# Patient Record
Sex: MALE | Race: WHITE | NOT HISPANIC OR LATINO | Employment: OTHER
[De-identification: names, ages, dates, MRNs, and addresses within clinical notes are randomized per-mention and may not be internally consistent; named-entity substitution may affect disease eponyms.]

---

## 2020-03-02 ENCOUNTER — HEALTH MAINTENANCE LETTER (OUTPATIENT)
Age: 65
End: 2020-03-02

## 2020-12-20 ENCOUNTER — HEALTH MAINTENANCE LETTER (OUTPATIENT)
Age: 65
End: 2020-12-20

## 2021-04-18 ENCOUNTER — HEALTH MAINTENANCE LETTER (OUTPATIENT)
Age: 66
End: 2021-04-18

## 2021-10-03 ENCOUNTER — HEALTH MAINTENANCE LETTER (OUTPATIENT)
Age: 66
End: 2021-10-03

## 2022-01-23 ENCOUNTER — HEALTH MAINTENANCE LETTER (OUTPATIENT)
Age: 67
End: 2022-01-23

## 2022-03-21 ENCOUNTER — MEDICAL CORRESPONDENCE (OUTPATIENT)
Dept: HEALTH INFORMATION MANAGEMENT | Facility: CLINIC | Age: 67
End: 2022-03-21
Payer: MEDICARE

## 2022-04-05 ENCOUNTER — TRANSFERRED RECORDS (OUTPATIENT)
Dept: HEALTH INFORMATION MANAGEMENT | Facility: CLINIC | Age: 67
End: 2022-04-05
Payer: MEDICARE

## 2022-04-18 ENCOUNTER — MEDICAL CORRESPONDENCE (OUTPATIENT)
Dept: HEALTH INFORMATION MANAGEMENT | Facility: CLINIC | Age: 67
End: 2022-04-18
Payer: MEDICARE

## 2022-04-18 ENCOUNTER — TRANSFERRED RECORDS (OUTPATIENT)
Dept: HEALTH INFORMATION MANAGEMENT | Facility: CLINIC | Age: 67
End: 2022-04-18
Payer: MEDICARE

## 2022-04-18 ENCOUNTER — TELEPHONE (OUTPATIENT)
Dept: SURGERY | Facility: CLINIC | Age: 67
End: 2022-04-18
Payer: MEDICARE

## 2022-04-18 ENCOUNTER — TRANSCRIBE ORDERS (OUTPATIENT)
Dept: OTHER | Age: 67
End: 2022-04-18

## 2022-04-18 DIAGNOSIS — K22.5 ZENKER DIVERTICULUM: Primary | ICD-10-CM

## 2022-04-18 NOTE — TELEPHONE ENCOUNTER
[facility] Call [facility]    Phone Message    May a detailed message be left on voicemail: no     Reason for Call: Appointment Intake    Referring Provider Name: Liliana PORTILLO @ [facility]  Diagnosis and/or Symptoms: Zenker diverticulum [K22.5]    Patient scheduled 1st available, if sooner appointment is needed please reach out to patient.    Action Taken: Other: ENT    Travel Screening: Not Applicable

## 2022-04-19 NOTE — TELEPHONE ENCOUNTER
FUTURE VISIT INFORMATION      FUTURE VISIT INFORMATION:    Date: 5/9/22    Time: 2:40PM    Location: [facility] [place]  REFERRAL INFORMATION:    Referring provider:  Liliana Cisneros PA     Referring providers clinic:  [facility]    Reason for visit/diagnosis  Per Pt, dx Zenker diverticulum [K22.5] referral from Liliana PORTILLO @ [facility]    RECORDS REQUESTED FROM:       Clinic name Comments Records Status Imaging Status   [facility] 4/5/22 note from Liliana Cisneros PA  Care everywhere     [facility]        1/30/18 ENDOSCOPIC DIVERTICULECTOMY ZENKERS Care everywhere    [facility]   [facility]    [address] [place] [address]    [phone]   4/18/22 XR Esophagus - PACS    11/22/17 XR Video Swallow   11/16/17 XR Esophagus   2/28/17 XR Chest    Care everywhere req 4/19/22 - PACS                       4/19/22 1:47PM called aida, Lyndsey images will be pushed shortly - Amay

## 2022-04-22 ENCOUNTER — TELEPHONE (OUTPATIENT)
Dept: OTOLARYNGOLOGY | Facility: CLINIC | Age: 67
End: 2022-04-22
Payer: MEDICARE

## 2022-04-22 NOTE — TELEPHONE ENCOUNTER
I tried calling this patient to help reschedule the appt with Dr. Barajas on 5/9/22. This appt needs to be rescheduled as [facility] New Throat with Dr. Cho. This was reviewed by ENT staff and Dr. Cho is the correct provider and next available is OK, does not need to be an urgent appt.      Appt Note- Ref by Liliana PORTILLO @ [facility] for Zenker Diverticulum

## 2022-04-22 NOTE — TELEPHONE ENCOUNTER
[facility] Call Center    Phone Message    May a detailed message be left on voicemail: yes     Reason for Call: Appointment Intake    Referring Provider Name: Liliana PORTILLO   Diagnosis and/or Symptoms:Zenker diverticulum    Per notes, schedule with Dr. Cho next available, patient states he can not wait until August, would like a sooner appt. Please reach out to patient with other scheduling options if available. Thank you      Action Taken: Message routed to:  [facility] ENT     Travel Screening: Not Applicable

## 2022-04-26 ENCOUNTER — DOCUMENTATION ONLY (OUTPATIENT)
Dept: OTOLARYNGOLOGY | Facility: CLINIC | Age: 67
End: 2022-04-26
Payer: MEDICARE

## 2022-04-26 DIAGNOSIS — R13.12 OROPHARYNGEAL DYSPHAGIA: Primary | ICD-10-CM

## 2022-04-26 NOTE — CONFIDENTIAL NOTE
Video Esophagram Review Rounds  Imaging Review of MBSS conducted with attending physician Azra Cho and reviewed/discussed with SLP Helen Valadez, Effie Wilson, Bety Perez, and/or Smita Dow    Relevant Background:  1/30/18 ENDOSCOPIC DIVERTICULECTOMY ZENKERS    Esophagram: 4/18/22 outside zenker's    Recommendations:  Needs Xray Video Swallow Exam and office evaluation

## 2022-05-04 NOTE — TELEPHONE ENCOUNTER
FUTURE VISIT INFORMATION      FUTURE VISIT INFORMATION:    Date: 6/28/22    Time: 1PM    Location: [facility] [place]  REFERRAL INFORMATION:    Referring provider:  Liliana Cisneros PA     Referring providers clinic:  [facility]    Reason for visit/diagnosis  Per Pt, dx Zenker diverticulum [K22.5] referral from Liliana PORTILLO @ [facility]     RECORDS REQUESTED FROM:         Clinic name Comments Records Status Imaging Status   [facility] 4/5/22 note from Liliana Cisneros PA  Care everywhere       [facility]          1/30/18 ENDOSCOPIC DIVERTICULECTOMY ZENKERS Care everywhere     [facility]   [facility]    [address] [place] [address]    [phone]   4/18/22 XR Esophagus - PACS     11/22/17 XR Video Swallow   11/16/17 XR Esophagus   2/28/17 XR Chest     Care everywhere req 4/19/22 - PACS

## 2022-05-09 ENCOUNTER — PRE VISIT (OUTPATIENT)
Dept: OTOLARYNGOLOGY | Facility: CLINIC | Age: 67
End: 2022-05-09
Payer: MEDICARE

## 2022-05-10 DIAGNOSIS — R13.10 DYSPHAGIA: Primary | ICD-10-CM

## 2022-05-11 ENCOUNTER — TELEPHONE (OUTPATIENT)
Dept: OTOLARYNGOLOGY | Facility: CLINIC | Age: 67
End: 2022-05-11
Payer: MEDICARE

## 2022-05-11 NOTE — TELEPHONE ENCOUNTER
[facility] Call Center    Phone Message    May a detailed message be left on voicemail: yes     Reason for Call: Other: pt got a message that a swallow test was ordered for him by Dr. Cho and that it was scheduled. Pt would like to know why another swallow test is being done when he had one done in April. Please call pt back to discuss with him why the procedure was ordered.    Thank you   Action Taken: Message routed to:  [facility] ent    Travel Screening: Not Applicable

## 2022-05-11 NOTE — TELEPHONE ENCOUNTER
I tried calling this patient to help schedule his Video Swallow Study. There was one option on the day of his follow up so I scheduled that. If he calls back to reschedule it can be any option prior to the follow up with Dr. Cho on 6/28 @ 1pm.      --Appt Info--  Tuesday, 6/28 @ 11am Video Swallow Study  Tuesday, 6/28 @ 1pm Dr. Cho follow up

## 2022-05-12 NOTE — TELEPHONE ENCOUNTER
Writer called to explain to patient the difference in the swallow studies he had done and the new order. Patient verbalized understanding and thanked me for the explanation.     Sarah Santiago RN on 5/12/2022 at 4:19 PM

## 2022-06-27 NOTE — PROGRESS NOTES
[facility]   at the [facility]   Otolaryngology Clinic     Patient: Chris Meadows    MRN: [id number]    : 1955    Age/Gender: 66 year old male  Date of Service: 2022  Rendering Provider:   Azra Cho MD     Referring Provider   PCP: No Ref-Primary, Physician  Referring Physician: Referred Self, MD  No address on file  Reason for Consultation   Dysphonia  Dysphagia  History   HISTORY OF PRESENT ILLNESS: Mr. Meadows is a 66 year old male who presents to us today with dysphagia.      Of note, has history of endoscopic zenker's diverticulectomy.    he presents today for evaluation. he reports:    Dysphonia  - for a while now  - has difficulty projecting at times  - he can project if he tries  - voice goes out when he is in Jew - when he has to read, after he walks up to the front of the Jew  - his wife has trouble understanding him  - has associated sore throat which is the reason why he initially went back to the doctor    Dysphagia  - had surgery in 2018  - had food get stuck  - doesn't have that now  - but feels food sometimes take a while to go down     Dyspnea  - denies    Throat clearing/cough  - does have some throat clearing    GERD/LPRD   - rajeev    PAST MEDICAL HISTORY:   Past Medical History:   Diagnosis Date     Allergic rhinitis, cause unspecified      Diverticulosis of colon (without mention of hemorrhage)      Hallux malleus      Intestinal disaccharidase deficiencies and disaccharide malabsorption      Osteoarthrosis, unspecified whether generalized or localized, lower leg      Other and unspecified hyperlipidemia      Unspecified sleep apnea        PAST SURGICAL HISTORY:   Past Surgical History:   Procedure Laterality Date     COMBINED REPAIR PTOSIS WITH BLEPHAROPLASTY BILATERAL  2013    Procedure: COMBINED REPAIR PTOSIS WITH BLEPHAROPLASTY BILATERAL;  BILATERAL UPPER LID BLEPHAROPLASTY AND PTOSIS REPAIR  ;  Surgeon: River Lazo MD;  Location:  [facility]       CURRENT MEDICATIONS:   Current Outpatient Medications:      Ascorbic Acid (VITAMIN C PO), Take 500 mg by mouth., Disp: , Rfl:      chromium 200 MCG CAPS, Take 200 mcg by mouth daily., Disp: , Rfl:      Cinnamon Bark POWD, , Disp: , Rfl:      clindamycin (CLEOCIN) 150 MG capsule, Take 1 capsule (150 mg) by mouth 3 times daily, Disp: 24 capsule, Rfl: 0     co-enzyme Q-10 100 MG CAPS, Take  by mouth daily., Disp: , Rfl:      dhea 25 MG TABS, Take 25 mg by mouth daily., Disp: , Rfl:      glucosamine-chondroitin 500-400 MG CAPS, Take 1 capsule by mouth daily., Disp: , Rfl:      Omega-3 Fatty Acids (OMEGA-3 FISH OIL PO), Take 500 mg by mouth., Disp: , Rfl:      vitamin D (ERGOCALCIFEROL) 63839 UNIT capsule, Take 400 Units by mouth daily., Disp: , Rfl:      VITAMIN E NATURAL PO, Take  by mouth., Disp: , Rfl:      multivitamin, therapeutic with minerals (MULTI-VITAMIN) TABS, Take 1 tablet by mouth daily., Disp: , Rfl:      red yeast rice 600 MG CAPS, Take 600 mg by mouth daily., Disp: , Rfl:      SAW BJ, , Disp: , Rfl:   No current facility-administered medications for this visit.    Facility-Administered Medications Ordered in Other Visits:      barium sulfate 40% (VARIBAR THIN HONEY) oral suspension 25 mL, 25 mL, Oral, Once, Haja Sow MD    ALLERGIES: Vicodin [hydrocodone-acetaminophen]    SOCIAL HISTORY:    Social History     Socioeconomic History     Marital status:      Spouse name: Not on file     Number of children: Not on file     Years of education: Not on file     Highest education level: Not on file   Occupational History     Not on file   Tobacco Use     Smoking status: Former Smoker     Quit date: 1988     Years since quittin.9     Smokeless tobacco: Not on file   Substance and Sexual Activity     Alcohol use: No     Drug use: No     Sexual activity: Never   Other Topics Concern     Parent/sibling w/ CABG, MI or angioplasty before 65F 55M? Not Asked   Social  History Narrative     Not on file     Social Determinants of Health     Financial Resource Strain: Not on file   Food Insecurity: Not on file   Transportation Needs: Not on file   Physical Activity: Not on file   Stress: Not on file   Social Connections: Not on file   Intimate Partner Violence: Not on file   Housing Stability: Not on file         FAMILY HISTORY: History reviewed. No pertinent family history.  Non-contributory for problems with anesthesia    REVIEW OF SYSTEMS:   The patient was asked a 14 point review of systems regarding constitutional symptoms, eye symptoms, ears, nose, mouth, throat symptoms, cardiovascular symptoms, respiratory symptoms, gastrointestinal symptoms, genitourinary symptoms, musculoskeletal symptoms, integumentary symptoms, neurological symptoms, psychiatric symptoms, endocrine symptoms, hematologic/lymphatic symptoms, and allergic/ immunologic symptoms.   The pertinent factors have been included in the HPI and below.  Patient Supplied Answers to Review of Systems  [facility] ROS 6/23/2022   Ears, Nose, Throat: Hearing loss, Nasal congestion or drainage, Sore throat   Musculoskeletal: Back pain, Neck pain       Physical Examination   The patient underwent a physical examination as described below. The pertinent positive and negative findings are summarized after the description of the examination.  Constitutional: The patient's developmental and nutritional status was assessed. The patient's voice quality was assessed.  Head and Face: The head and face were inspected for deformities. The sinuses were palpated. The salivary glands were palpated. Facial muscle strength was assessed bilaterally.  Eyes: Extraocular movements and primary gaze alignment were assessed.  Ears, Nose, Mouth and Throat: The ears and nose were examined for deformities. The nasal septum, mucosa, and turbinates were inspected by anterior rhinoscopy. The lips, teeth, and gums were examined for abnormalities. The oral  mucosa, tongue, palate, tonsils, lateral and posterior pharynx were inspected for the presence of asymmetry or mucosal lesions.    Neck: The tracheal position was noted, and the neck mass palpated to determine if there were any asymmetries, abnormal neck masses, thyromegally, or thyroid nodules.  Respiratory: The nature of the breathing and chest expansion/symmetry was observed.  Cardiovascular: The patient was examined to determine the presence of any edema or jugular venous distension.  Abdomen: The contour of the abdomen was noted.  Lymphatic: The patient was examined for infraclavicular lymphadenopathy.  Musculoskeletal: The patient was inspected for the presence of skeletal deformities.  Extremities: The extremities were examined for any clubbing or cyanosis.  Skin: The skin was examined for inflammatory or neoplastic conditions.  Neurologic: The patient's orientation, mood, and affect were noted. The cranial nerve  functions were examined.  Other pertinent positive and negative findings on physical examination:      OC/OP: no lesions, uvula midline, soft palate elevates symmetrically  Neck: no lesions, R>L TH tenderness to palpation    All other physical examination findings were within normal limits and noncontributory.  Procedures   Flexible laryngoscopy (CPT 86424)      Pre-procedure diagnosis: dysphonia  Post-procedure diagnosis: same as above  Indication for procedure: Mr. Meadows is a 66 year old male with see above  Procedure(s): Fiberoptic Laryngoscopy    Details of Procedure: After informed consent was obtained, the patient was seated in the examination chair.  The areas of the nasopharynx as well as the hypopharynx were anesthetized with topical 4% lidocaine with 0.25% phenylephrine atomizer.  Examination of the base of tongue was performed first.  Attention was directed to any evidence of masses in the area or evidence of leukoplakia or candidal infection.  Attention was directed to the epiglottis  where its size and position was determined and its movement on phonation of the vowel  e .  The piriform sinuses were then inspected for any mass lesions or pooling of secretions.  Attention was then directed to the larynx. The vocal folds were inspected for infection or any areas of leukoplakia, for masses, polypoid degeneration, or hemorrhage.  Having done this, the arytenoids and vocal processes were inspected for erythema or evidence of granuloma formation.  The posterior commissure was then inspected for evidence of inflammatory changes in the mucosa and heaping up of mucosal tissue. The patient was then instructed to say the vowel  e .  Adduction of vocal folds to the midline was observed for any evidence of paresis or paralysis of the larynx or asymmetry in rotation of the larynx to the left or right. The patient was asked to breathe and the degree of abduction was noted bilaterally.  Subglottic view of the larynx was obtained for any additional mass lesions or mucosal changes.  Finally the post cricoid was examined for evidence of pooling of secretions, as well as the pharyngeal wall mucosa.   Anesthesia type: 0.25% phenylephrine    Findings:  Anatomic/physiological deviations: RNC, presbylarynx, mild mucus regurgitating in the left pyriform   Right vocal process: No restriction of mobility   Left vocal process: No restriction of mobility  Glottal gap: Glottal gap  Supraglottic structures: Normal  Hypopharynx: Normal     Estimated Blood Loss: minimal  Complications: None  Disposition: Patient tolerated the procedure well                  Fiberoptic Endoscopic Evaluation of Swallowing (CPT 44211)  and Interpretation of Swallowing (CPT 88664)    Indications: See above notes for complete history and physical. Patient complains of dysphagia to both solids and liquids and/or there is suggestion on history and endoscopic exam of the presence of dysphagia causing medical complaints.  Swallowing evaluation is being  performed to assess the presence and degree of dysphagia, and to recommend a safe diet.     Pulmonary Status:  No PNA   Current Diet:              regular                                        thin liquids      Consistency Amounts:  Thin Liquid: sip   Puree: 1 tsp  Solid: cookies            Positioning: upright in a chair  Oral Peripheral Exam: See physical exam section.  Anatomic Notes: See Videostroboscopy report for assessment of anatomy and laryngeal functioning  Pharyngeal secretions prior to administration of liquid or food: No  Oral Phase Abnormal Findings: No abnormal behavior observed  Behavioral Adaptations: No abnormal behavior observed  Pharyngeal Phase Abnormal Findings: very slight EPR with the first bite of puree    Recommended Diet:  regular                                        thin liquids               Review of Relevant Clinical Data   I personally reviewed:  Liliana Cisneros 4/5/22 -   Patient's sore throat started in December when he had COVID. He feels like he is swallowing pretty well now. He does not feel like food is getting stuck. He has an occasional cough and a raspy voice. Non-smoker. Has allergies and uses a saline nasal spray. He has been taking Protonix the last couple weeks but does not think it is helping much. He denies heartburn symptoms but does endorse stomach bloating and gas. Denies sinusitis, nasal drainage. He has some nasal congestion. He tries to sleep slightly elevated.    He has a history of Zenker's diverticulum and had surgery back in 2018          Notes: Video Esophagram Review Rounds  Imaging Review of MBSS conducted with attending physician Azra Cho and reviewed/discussed with SLP Helen Valadez, Effie Wilson, Bety Perez, and/or Smita Dow     Relevant Background:  1/30/18 ENDOSCOPIC DIVERTICULECTOMY ZENKERS     Esophagram: 4/18/22 outside zenker's     Recommendations:  Needs Xray Video Swallow Exam and office evaluation          MBSS Date:  "6/28/22     Findings:  Pharyngeal Weakness:No  Epiglottic dysfunction: No  Penetration: No  Aspiration: No  UES dysfunction: zenker's  Details: safe swallow, zenker's - does not regurgitate        Recommendations:  Diet: current            Radiology:    Pathology:    Procedures:    Labs:  No results found for: TSH  No results found for: NA, CO2, BUN, CREAT, GLUCOSE, PHOS  No results found for: WBC, HGB, HCT, MCV, PLT  No results found for: PT, PTT, INR  No results found for: MICHAEL  No components found for: RHEUMATOIDFACTOR,  RF  No results found for: CRP  No components found for: CKTOT, URICACID  No components found for: C3, C4, DSDNAAB, NDNAABIFA  No results found for: MPOAB    Patient reported Quality of Life (QOL) Measures   Patient Supplied Answers To VHI Questionnaire  Voice Handicap Index (VHI-10) 6/23/2022   My voice makes it difficult for people to hear me 2   People have difficulty understanding me in a noisy room 2   My voice difficulties restrict my personal and social life.  2   I feel left out of conversations because of my voice 1   My voice problem causes me to lose income 0   I feel as though I have to strain to produce voice 1   The clarity of my voice is unpredictable 2   My voice problem upsets me 2   My voice makes me feel handicapped 2   People ask, \"What's wrong with your voice?\" 1   VHI-10 15         Patient Supplied Answers To EAT Questionnaire  Eating Assessment Tool (EAT-10) 6/23/2022   My swallowing problem has caused me to lose weight 0   My swallowing problem interferes with my ability to go out for meals 0   Swallowing liquids takes extra effort 0   Swallowing solids takes extra effort 1   Swallowing pills takes extra effort 1   Swallowing is painful 1   The pleasure of eating is affected by my swallowing 1   When I swallow food sticks in my throat 0   I cough when I eat 0   Swallowing is stressful 1   EAT-10 5         Patient Supplied Answers To CSI Questionnaire  Cough Severity Index " (CSI) 2022   My cough is worse when I lie down 0   My coughing problem causes me to restrict my personal and social life 0   I tend to avoid places because of my cough problem 0   I feel embarrassed because of my coughing problem 0   People ask, ''What's wrong?'' because I cough a lot 0   I run out of air when I cough 0   My coughing problem affects my voice 1   My coughing problem limits my physical activity 0   My coughing problem upsets me 0   People ask me if I am sick because I cough a lot 0   CSI Score 1         Patient Supplied Answers to Dyspnea Index Questionnaire:  No flowsheet data found.    Impression & Plan     IMPRESSION: Mr. Meadows is a 66 year old male who is being seen for the followin. Dysphagia  - in the setting of endoscopic zenker's diverticulectomy in 2018  - now with sore throat since 2021 after COVID  - overall throat is better however in the work up he was found to have his zenker's back  - is symptomatic but not as much as before  - Xray Video Swallow Exam today reviewed at rounds shows a zenker's diverticulum without EPR, this is similar to the FEES however on the scope he did have saliva regurgitate in the left pyriform and also very slight puree EPR with the first bite  - discussed that his sore throat symptoms are likely more due to his muscle tension dypshonia rather than the pouch  - however he does have slight symptoms from the zenker's so would recommend surgery  - he would like to get this done since he has been waiting for this for a few months  - discussed given he has favorable anatomy an endoscopic CO2 laser myotomy is indicated  - risks, benefits and alternatives were discussed   Plan  - schedule surgery    2. 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 Meadows. If you have any questions, please do not hesitate to contact me.    Azra Cho MD    Laryngology    [facility]  Department of  Otolaryngology - Head and Neck Surgery  [facility]  [address] [place] [address]  Appointment line: [phone]  Fax: [phone]  [email or web address]

## 2022-06-28 ENCOUNTER — OFFICE VISIT (OUTPATIENT)
Dept: OTOLARYNGOLOGY | Facility: CLINIC | Age: 67
End: 2022-06-28
Payer: MEDICARE

## 2022-06-28 ENCOUNTER — ANCILLARY PROCEDURE (OUTPATIENT)
Dept: GENERAL RADIOLOGY | Facility: CLINIC | Age: 67
End: 2022-06-28
Attending: OTOLARYNGOLOGY
Payer: MEDICARE

## 2022-06-28 ENCOUNTER — DOCUMENTATION ONLY (OUTPATIENT)
Dept: OTOLARYNGOLOGY | Facility: CLINIC | Age: 67
End: 2022-06-28

## 2022-06-28 ENCOUNTER — PREP FOR PROCEDURE (OUTPATIENT)
Dept: OTOLARYNGOLOGY | Facility: CLINIC | Age: 67
End: 2022-06-28

## 2022-06-28 ENCOUNTER — PRE VISIT (OUTPATIENT)
Dept: OTOLARYNGOLOGY | Facility: CLINIC | Age: 67
End: 2022-06-28
Payer: MEDICARE

## 2022-06-28 ENCOUNTER — THERAPY VISIT (OUTPATIENT)
Dept: SPEECH THERAPY | Facility: CLINIC | Age: 67
End: 2022-06-28
Attending: OTOLARYNGOLOGY
Payer: MEDICARE

## 2022-06-28 VITALS
BODY MASS INDEX: 27.4 KG/M2 | DIASTOLIC BLOOD PRESSURE: 67 MMHG | HEIGHT: 69 IN | OXYGEN SATURATION: 93 % | HEART RATE: 63 BPM | WEIGHT: 185 LBS | SYSTOLIC BLOOD PRESSURE: 134 MMHG

## 2022-06-28 DIAGNOSIS — R49.0 DYSPHONIA: Primary | ICD-10-CM

## 2022-06-28 DIAGNOSIS — R13.12 OROPHARYNGEAL DYSPHAGIA: Primary | ICD-10-CM

## 2022-06-28 DIAGNOSIS — J38.3 VOCAL CORD BOWING: ICD-10-CM

## 2022-06-28 DIAGNOSIS — R13.10 DYSPHAGIA, UNSPECIFIED TYPE: Primary | ICD-10-CM

## 2022-06-28 DIAGNOSIS — K22.0 CRICOPHARYNGEAL ACHALASIA: Primary | ICD-10-CM

## 2022-06-28 DIAGNOSIS — R13.10 DYSPHAGIA: ICD-10-CM

## 2022-06-28 DIAGNOSIS — R07.0 THROAT PAIN: ICD-10-CM

## 2022-06-28 PROCEDURE — 92524 BEHAVRAL QUALIT ANALYS VOICE: CPT | Mod: GN | Performed by: SPEECH-LANGUAGE PATHOLOGIST

## 2022-06-28 PROCEDURE — 99204 OFFICE O/P NEW MOD 45 MIN: CPT | Mod: 25 | Performed by: OTOLARYNGOLOGY

## 2022-06-28 PROCEDURE — 92612 ENDOSCOPY SWALLOW (FEES) VID: CPT | Mod: GN | Performed by: OTOLARYNGOLOGY

## 2022-06-28 PROCEDURE — 92610 EVALUATE SWALLOWING FUNCTION: CPT | Mod: GN | Performed by: SPEECH-LANGUAGE PATHOLOGIST

## 2022-06-28 PROCEDURE — 92613 ENDOSCOPY SWALLOW (FEES) I&R: CPT | Performed by: OTOLARYNGOLOGY

## 2022-06-28 PROCEDURE — 92611 MOTION FLUOROSCOPY/SWALLOW: CPT | Mod: GN | Performed by: SPEECH-LANGUAGE PATHOLOGIST

## 2022-06-28 PROCEDURE — 74230 X-RAY XM SWLNG FUNCJ C+: CPT | Mod: GC | Performed by: RADIOLOGY

## 2022-06-28 RX ORDER — BARIUM SULFATE 400 MG/ML
25 SUSPENSION ORAL ONCE
Status: ACTIVE | OUTPATIENT
Start: 2022-06-28

## 2022-06-28 RX ORDER — BARIUM SULFATE 400 MG/ML
25 SUSPENSION ORAL ONCE
Status: COMPLETED | OUTPATIENT
Start: 2022-06-28 | End: 2022-06-28

## 2022-06-28 RX ORDER — AMPICILLIN AND SULBACTAM 2; 1 G/1; G/1
3 INJECTION, POWDER, FOR SOLUTION INTRAMUSCULAR; INTRAVENOUS
Status: CANCELLED | OUTPATIENT
Start: 2022-06-28

## 2022-06-28 RX ORDER — AMPICILLIN AND SULBACTAM 1; .5 G/1; G/1
1.5 INJECTION, POWDER, FOR SOLUTION INTRAMUSCULAR; INTRAVENOUS SEE ADMIN INSTRUCTIONS
Status: CANCELLED | OUTPATIENT
Start: 2022-06-28

## 2022-06-28 RX ADMIN — BARIUM SULFATE 25 ML: 400 SUSPENSION ORAL at 10:59

## 2022-06-28 ASSESSMENT — PAIN SCALES - GENERAL: PAINLEVEL: NO PAIN (0)

## 2022-06-28 NOTE — PATIENT INSTRUCTIONS
If you have any questions you may reach out to me at [email or web address] once some level of normalcy has returned you may reach me via my office phone number is [phone].    I will have my colleague Michael Tamez who helps us with financial counseling reach out regarding the cost of therapy, expect to hear from him in the near future.    Sarah

## 2022-06-28 NOTE — PATIENT INSTRUCTIONS
You were seen in the ENT Clinic today by Dr. Cho. If you have any questions or concerns after your appointment, please contact us (see below)    2.   SLP Therapy with Colin.    3.Schedule Surgery first week in August, 2022.     5. Schedule pre-op or keep current PCP appointment in July, 8 to do so.    4.Please return to clinic - one month after surgery.  Surgery Teaching    1. Someone from our scheduling department will call you within the next few days to get you scheduled with your provider for surgery. If no one has called you in one week, please notify us.    2. You must have a physical exam (called  history and physical ) within 30 days of surgery. You may complete this with your primary care provider.   A. If your provider is outside of the [facility] network please have them complete the preoperative forms provided to you in the surgery packet you will be mailed and be sure to have your provider fax them to the appropriate location prior to surgery. For surgery at the [facility] the fax number [phone]. For surgery at the [place] the fax number is [phone].  B. In some cases we may have you see our Preoperative Assessment Center. If we have expressed this to you, our  will set up your appointment with them when they call to set up your surgery.    3. Complete a COVID test 4 days prior to surgery. You will need to have this done regardless of whether you have had the COVID vaccine. If you have the test performed at a clinic outside of the network, you will need to have the test results faxed to us.    4.Covid test required    If you're going home on the same day as your procedure (surgery):  1 to 2 days before your procedure, take an at-home, rapid antigen test. You can buy these at many pharmacy stores. Or you can order free, at-home tests at covid.gov/tests. If you can't find an at-home, rapid antigen test, please schedule a PCR test with [facility] by calling [phone], or visiting  [email or web address]. We can't accept tests that are more than 4 days old.  If your test is negative, please take a photo of the test. Show the photo to the nurse when you come in for your procedure.  If your test is positive, please see the \"If your test shows you have COVID-19\" section on this page    If you're staying overnight in the hospital:4 days before your procedure, please get a PCR test from a lab.  To schedule a PCR test with [facility], call [phone]. Or, visit   [email or web address].  If your test is negative, please ask the testing location to fax your results to us at [phone].  If your test is positive, please tell your surgeon's office right away. A positive test means that you have COVID-19. We'll probably have to postpone your admission, surgery or procedure. Your care team will discuss this with you. After that, we'll let you know what to do and when you can re-schedule.    5. Ask your doctor what medicines are safe before surgery. For over the counter medications and supplements it is advised that you do NOT TAKE MOTRIN, IBUPROFEN, ASPIRIN, ALEVE, GARLIC SUPPLEMENTS or FISH OIL x 7 days prior to surgery (to prevent excess bleeding and bruising at time of surgery). If your provider advises you to take any medication the morning of surgery you should take this with a sip of water.    6. A few days prior to surgery a nurse will call you to review your health history and instructions for before and after surgery. They will give you your final arrival time based upon your scheduled arrival time for surgery.    7. Call the surgical team if there's any change in your health prior to surgery. Things you should call for include but are not limited to signs of a cold or the flu (sore throat, runny nose, cough, rash, fever). Other things to notify them for is for any open wounds (cuts, scrapes, scratches) near to the surgery site.    8. If you drink " alcohol, stop drinking alcohol at least 24 hours before surgery.    9. If you smoke, stop or at least cut down on smoking 24 hours before surgery.    10.Take a bath or shower the night before and the morning of surgery (as told by your surgeon). Use an antiseptic soap. If your doctor does not give you special soap, buy Hibiclens or Eliza-Stat at the drug store or ask the pharmacist to suggest a brand. You will wash with this from the neck down, washing your hair and face as you would normally.   A. When you are done with your shower please be sure to use clean towels to dry with, have clean linens on your bed, and put on clean clothes each time.   B. DO NOT put on lotion, powder, perfume, deodorant or make-up after bathing.    11. You can eat a normal meal the night before surgery. Do not eat any solid foods or drink any milk products for 8 hours before surgery.     12. You may drink clear liquids until 2 hours before surgery. Clear liquids include water, Gatorade, apple juice and liquids you can see through.    13. No eating or drinking 2 hours prior to surgery until after surgery. Your post op team will review any diet limitations you might have and when you can start eating and drinking again after surgery.      If you have any questions before or after surgery please call:    How to Contact Us:  Send a [employer] message to your provider. Our team will respond to you via [employer]. Occasionally, we will need to call you to get further information.  For urgent matters (Monday-Friday), call the ENT Clinic: [phone] and speak with a call center team member - they will route your call appropriately.   If you'd like to speak directly with a nurse, please find our contact information below. We do our best to check voicemail frequently throughout the day, and will work to call you back within 1-2 days. For urgent matters, please use the general clinic phone numbers listed above      KAMAR Malloy  ENT RN Care  Coordinator  [phone]      Ann PLUNKETT LPN  Direct: [phone]                     How to Contact Us:  Send a [employer] message to your provider. Our team will respond to you via [employer]. Occasionally, we will need to call you to get further information.  For urgent matters (Monday-Friday), call the ENT Clinic: [phone] and speak with a call center team member - they will route your call appropriately.   If you'd like to speak directly with a nurse, please find our contact information below. We do our best to check voicemail frequently throughout the day, and will work to call you back within 1-2 days. For urgent matters, please use the general clinic phone numbers listed above.      KAMAR Malloy  ENT RN Care Coordinator      Ann PLUNKTET LPN  Direct: [phone]

## 2022-06-28 NOTE — LETTER
Date:July 1, 2022      Patient was self referred, no letter generated. Do not send.        [facility] Health Information

## 2022-06-28 NOTE — LETTER
"6/28/2022       RE: Chris Meadows  [address] [address] [facility] [address]     Dear Colleague,    Thank you for referring your patient, Chris Meadows, to the [facility] VOICE CLINIC [place] at [facility]. Please see a copy of my visit note below.    [facility] VOICE [facility]  Evaluation report    Clinician: Papa Fernandez M.M. M.A., CCC/SLP  Seen in conjunction with: Dr. Cho  Referring physician:  Liliana PORTILLO  Patient: Chris Meadows  Date of Visit: 6/28/2022    HISTORY  Chief complaint: Chris Meadows is a 66 year old gentleman presenting today for evaluation of dysphagia and sore throat.    Chart Review: He has a history significant for patient has a history of Zenker's diverticulum which was surgically repaired 2018.  Patient was seen on 4/5/2022 at the [place] [facility].  At that time he reported the sensation of sore throat.  Laryngeal evaluation was within normal limits, and the patient was subsequently sent for repeat esophagram.  The results of that demonstrated \"a Zenker's diverticulum is present, centered around C6-7 and measuring about 3.6 cm and 2.8 in the AP dimension.  Contrast remained within.  Patient was subsequently referred to our clinic for further evaluation and treatment as warranted.  He presents for this today.    Of note I was unable to be present for the history gathering section of today's appointment.  Please see Dr. Cho and my colleague Smita Dow's notes for additional details in this regard.    Past Medical History:   Diagnosis Date     Allergic rhinitis, cause unspecified      Diverticulosis of colon (without mention of hemorrhage)      Hallux malleus      Intestinal disaccharidase deficiencies and disaccharide malabsorption      Osteoarthrosis, unspecified whether generalized or localized, lower leg      Other and unspecified hyperlipidemia      Unspecified sleep apnea      Past Surgical History:   Procedure " "Laterality Date     COMBINED REPAIR PTOSIS WITH BLEPHAROPLASTY BILATERAL  7/25/2013    Procedure: COMBINED REPAIR PTOSIS WITH BLEPHAROPLASTY BILATERAL;  BILATERAL UPPER LID BLEPHAROPLASTY AND PTOSIS REPAIR  ;  Surgeon: River Lazo MD;  Location: [facility]       OBJECTIVE  PATIENT REPORTED MEASURES  Patient Supplied Answers To VHI Questionnaire  Voice Handicap Index (VHI-10) 6/23/2022   My voice makes it difficult for people to hear me 2   People have difficulty understanding me in a noisy room 2   My voice difficulties restrict my personal and social life.  2   I feel left out of conversations because of my voice 1   My voice problem causes me to lose income 0   I feel as though I have to strain to produce voice 1   The clarity of my voice is unpredictable 2   My voice problem upsets me 2   My voice makes me feel handicapped 2   People ask, \"What's wrong with your voice?\" 1   VHI-10 15     Patient Supplied Answers To CSI Questionnaire  Cough Severity Index (CSI) 6/23/2022   My cough is worse when I lie down 0   My coughing problem causes me to restrict my personal and social life 0   I tend to avoid places because of my cough problem 0   I feel embarrassed because of my coughing problem 0   People ask, ''What's wrong?'' because I cough a lot 0   I run out of air when I cough 0   My coughing problem affects my voice 1   My coughing problem limits my physical activity 0   My coughing problem upsets me 0   People ask me if I am sick because I cough a lot 0   CSI Score 1     Patient Supplied Answers To EAT Questionnaire  Eating Assessment Tool (EAT-10) 6/23/2022   My swallowing problem has caused me to lose weight 0   My swallowing problem interferes with my ability to go out for meals 0   Swallowing liquids takes extra effort 0   Swallowing solids takes extra effort 1   Swallowing pills takes extra effort 1   Swallowing is painful 1   The pleasure of eating is affected by my swallowing 1   When I swallow food sticks " in my throat 0   I cough when I eat 0   Swallowing is stressful 1   EAT-10 5     PERCEPTUAL EVALUATION (CPT 56917)  POSTURE / TENSION:     neck and shoulders    BREATHING:     phonation is not coordinated with respiration    LARYNGEAL PALPATION:     tenderness of the thyrohyoid area    reduced thyrohyoid space    right greater than left    VOICE:    Roughness: Mild Intermittent    Breathiness: Minimal    Strain: Moderate Intermittent    Aesthenia: Mild to moderate Intermittent    Loudness    Conversational speech:  Moderately reduced intermittently    Projected speech:  WNL    Pitch:    Conversational speech:  WNL    Pitch glide:     Adequate access to left registration without notable breaks    Resonance:    Conversational speech:  laryngeal pharyngeal resonance    Singing vs. Speech: Decreased roughness and strain in singing versus running speech    COUGH/THROAT CLEARING:    Occasional    THERAPY PROBES: Improvement was elicited with use of forward resonant stimuli, use of clear speech protocol and coordination of respiration and phonation    LARYNGEAL EXAMINATION  Procedure: Flexible endoscopy with chip-tip technology without stroboscopy, right nostril; topical anesthesia with 3% Lidocaine and 0.25% phenylephrine was not applied.   Performed by: Dr. Azra Cho  The laryngeal and pharyngeal structures were evaluated for gross appearance, mobility, function, and focal lesions / abnormalities of the associated mucosa.    All findings were within normal limits with the exception of the following salient features:     Mild to mod pooling in left piriform sinus and to a lesser diegree postr cricoid an dright    VF appear bilaterally mildly concave     Intermittent 4 way constrictive hyperfunction in conjunction to non-optimal phonatory respiratory coordination, with frequent anterior glottal gap    Improved configuratation closure with forward resonant sounds and singing vs. Running speech    A screening swallow  evaluation was completed by Dr. Cho and my colleague Smita Dow CCC-SLP    The laryngeal exam was reviewed with Mr. Meadows, and I provided pertinent explanations, as well as written and oral information.    ASSESSMENT / PLAN  IMPRESSIONS: Chris Meadows is presenting today with throat discomfort and a history of Zenker's diverticulum status postsurgical repair in 2018.  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  Perceptually these findings manifest as intermittent strain, asthenia, and rough voice quality, that corresponds to moments of decreased respiratory drive and coordination of airflow, glottal control, and resonance.  In absence of discoordination increased muscular recruitment is noted and this corresponds to areas of tenderness on laryngeal palpation helping to explain the patient's persistent throat pain.  In addition to these elements laryngeal evaluation also showed some regurgitation from the UES consistent with ongoing presence of Zenker's diverticulum.  Please see Dr. Cho and Smita Dow CCC-SLP's notes from today's date for additional details regarding the patient's swallowing difficulties.      STIMULABILITY: results of therapy probes during perceptual and laryngeal evaluation demonstrate improvement with use of forward resonant stimuli and coordination of respiration and phonation    DETAILED RECOMMENDATIONS:     A course of speech therapy is recommended to optimize vocal technique, improve voice quality and promote reduced discomfort, effort and fatigue.    He demonstrates a Good prognosis for improvement given adherence to therapeutic recommendations.   o Positive indicators: positive response to therapy probes diagnosis is known to respond to treatment  o Negative indicators: External  locus of control    DURATION / FREQUENCY: 4 biweekly and 2 monthly one-hour sessions    Research: This patient is not a candidate.      GOALS:  Patient goal:     To understand the problem and fix it as much as possible    Short-term goal(s): Within the first 4 sessions, Mr. Meadows:    will demonstrate assigned laryngeal massage techniques with 80% accuracy or better with no clinician support    will be able to independently list key factors in maintenance of good vocal hygiene with 80% accuracy, and report on their use outside the therapy room.    will accurately identify target vs. habitual voice quality during therapy tasks in 4 out of 5 trials with no clinician support    will demonstrate the ability to alternate between target and habitual voice quality given clinician cue 75% of the time during therapy tasks    Long-term goal(s): In 6 months, Mr. Meadows will:    Report a week of typical vocal activities, in which dysphonia and throat discomfort do not exceed a level of 2 out of 10, 80% of the time   Certification period: June 28, 2022 - 9/26/22    This treatment plan was developed with the patient who agreed with the recommendations.    TOTAL SERVICE TIME: 30 minutes  EVALUATION OF VOICE AND RESONANCE (36179)  NO CHARGE FACILITY FEE (81338)    Papa Fernandez M.M., M.A., CCC-SLP  Speech-Language Pathologist  Certificate of Vocology  [phone]    *this report was created in part through the use of computerized dictation software, and though reviewed following completion, some typographic errors may persist.  If there is confusion regarding any of this notes contents, please contact me for clarification.*                                                                                       Outpatient Speech Language Therapy Evaluation  PLAN OF TREATMENT FOR OUTPATIENT REHABILITATION  (COMPLETE FOR INITIAL CLAIMS ONLY)  Patient's Last Name, First Name, M.I.  YOB: 1955  Chris Meadows                            Provider's Name  Colin Rebecca, SLP Medical Record No.  [id number]                               Onset Date:  6/28/22   Start of Care Date: 6/28/22     Type: Speech Language Therapy Medical Diagnosis: No primary diagnosis found.                        Therapy Diagnosis:  No primary diagnosis found.   Visits from SOC:  1   _________________________________________________________________________________  Plan of Treatment:   Speech therapy    DETAILED RECOMMENDATIONS:     A course of speech therapy is recommended to optimize vocal technique, improve voice quality and promote reduced discomfort, effort and fatigue.    He demonstrates a Good prognosis for improvement given adherence to therapeutic recommendations.   o Positive indicators: positive response to therapy probes diagnosis is known to respond to treatment  o Negative indicators: External locus of control    DURATION / FREQUENCY: 4 biweekly and 2 monthly one-hour sessions    Research: This patient is not a candidate.      GOALS:  Patient goal:     To understand the problem and fix it as much as possible    Short-term goal(s): Within the first 4 sessions, Mr. Meadows:    will demonstrate assigned laryngeal massage techniques with 80% accuracy or better with no clinician support    will be able to independently list key factors in maintenance of good vocal hygiene with 80% accuracy, and report on their use outside the therapy room.    will accurately identify target vs. habitual voice quality during therapy tasks in 4 out of 5 trials with no clinician support    will demonstrate the ability to alternate between target and habitual voice quality given clinician cue 75% of the time during therapy tasks    Long-term goal(s): In 6 months, Mr. Meadows will:    Report a week of typical vocal activities, in which dysphonia and throat discomfort do not exceed a level of 2 out of 10, 80% of the time      _________________________________________________________________________________    I CERTIFY THE NEED FOR THESE SERVICES FURNISHED UNDER        THIS PLAN OF TREATMENT AND WHILE UNDER MY CARE     (Physician attestation of this document indicates review and certification of the therapy plan).     Certification date from: 6/28/22  Certification date to: 9/26/22    Referring Provider: Azra Cho         Again, thank you for allowing me to participate in the care of your patient.      Sincerely,    Colin Fernandez, SLP

## 2022-06-28 NOTE — LETTER
Date:July 5, 2022      Patient was self referred, no letter generated. Do not send.        [facility] Health Information

## 2022-06-28 NOTE — LETTER
2022       RE: Chris Meadows  [id number] [facility] [address]     Dear Colleague,    Thank you for referring your patient, Chris Meadows, to the [facility] EAR NOSE AND THROAT [facility]INIC [place] at [facility]. Please see a copy of my visit note below.        [facility]   at the [facility]   Otolaryngology Clinic     Patient: Chris Meadows    MRN: [id number]    : 1955    Age/Gender: 66 year old male  Date of Service: 2022  Rendering Provider:   Azra Cho MD     Referring Provider   PCP: No Ref-Primary, Physician  Referring Physician: Referred Self, MD  No address on file  Reason for Consultation   Dysphonia  Dysphagia  History   HISTORY OF PRESENT ILLNESS: Mr. Meadows is a 66 year old male who presents to us today with dysphagia.      Of note, has history of endoscopic zenker's diverticulectomy.    he presents today for evaluation. he reports:    Dysphonia  - for a while now  - has difficulty projecting at times  - he can project if he tries  - voice goes out when he is in [employer] - when he has to read, after he walks up to the front of the [employer]  - his wife has trouble understanding him  - has associated sore throat which is the reason why he initially went back to the doctor    Dysphagia  - had surgery in 2018  - had food get stuck  - doesn't have that now  - but feels food sometimes take a while to go down     Dyspnea  - denies    Throat clearing/cough  - does have some throat clearing    GERD/LPRD   - rajeev    PAST MEDICAL HISTORY:   Past Medical History:   Diagnosis Date     Allergic rhinitis, cause unspecified      Diverticulosis of colon (without mention of hemorrhage)      Hallux malleus      Intestinal disaccharidase deficiencies and disaccharide malabsorption      Osteoarthrosis, unspecified whether generalized or localized, lower leg      Other and unspecified hyperlipidemia      Unspecified sleep  apnea        PAST SURGICAL HISTORY:   Past Surgical History:   Procedure Laterality Date     COMBINED REPAIR PTOSIS WITH BLEPHAROPLASTY BILATERAL  7/25/2013    Procedure: COMBINED REPAIR PTOSIS WITH BLEPHAROPLASTY BILATERAL;  BILATERAL UPPER LID BLEPHAROPLASTY AND PTOSIS REPAIR  ;  Surgeon: River Lazo MD;  Location: [facility]       CURRENT MEDICATIONS:   Current Outpatient Medications:      Ascorbic Acid (VITAMIN C PO), Take 500 mg by mouth., Disp: , Rfl:      chromium 200 MCG CAPS, Take 200 mcg by mouth daily., Disp: , Rfl:      Cinnamon Bark POWD, , Disp: , Rfl:      clindamycin (CLEOCIN) 150 MG capsule, Take 1 capsule (150 mg) by mouth 3 times daily, Disp: 24 capsule, Rfl: 0     co-enzyme Q-10 100 MG CAPS, Take  by mouth daily., Disp: , Rfl:      dhea 25 MG TABS, Take 25 mg by mouth daily., Disp: , Rfl:      glucosamine-chondroitin 500-400 MG CAPS, Take 1 capsule by mouth daily., Disp: , Rfl:      Omega-3 Fatty Acids (OMEGA-3 FISH OIL PO), Take 500 mg by mouth., Disp: , Rfl:      vitamin D (ERGOCALCIFEROL) 94050 UNIT capsule, Take 400 Units by mouth daily., Disp: , Rfl:      VITAMIN E NATURAL PO, Take  by mouth., Disp: , Rfl:      multivitamin, therapeutic with minerals (MULTI-VITAMIN) TABS, Take 1 tablet by mouth daily., Disp: , Rfl:      red yeast rice 600 MG CAPS, Take 600 mg by mouth daily., Disp: , Rfl:      JANELLE LORENZO, , Disp: , Rfl:   No current facility-administered medications for this visit.    Facility-Administered Medications Ordered in Other Visits:      barium sulfate 40% (VARIBAR THIN HONEY) oral suspension 25 mL, 25 mL, Oral, Once, Haja Sow MD    ALLERGIES: Vicodin [hydrocodone-acetaminophen]    SOCIAL HISTORY:    Social History     Socioeconomic History     Marital status:      Spouse name: Not on file     Number of children: Not on file     Years of education: Not on file     Highest education level: Not on file   Occupational History     Not on file   Tobacco Use      Smoking status: Former Smoker     Quit date: 1988     Years since quittin.9     Smokeless tobacco: Not on file   Substance and Sexual Activity     Alcohol use: No     Drug use: No     Sexual activity: Never   Other Topics Concern     Parent/sibling w/ CABG, MI or angioplasty before 65F 55M? Not Asked   Social History Narrative     Not on file     Social Determinants of Health     Financial Resource Strain: Not on file   Food Insecurity: Not on file   Transportation Needs: Not on file   Physical Activity: Not on file   Stress: Not on file   Social Connections: Not on file   Intimate Partner Violence: Not on file   Housing Stability: Not on file         FAMILY HISTORY: History reviewed. No pertinent family history.  Non-contributory for problems with anesthesia    REVIEW OF SYSTEMS:   The patient was asked a 14 point review of systems regarding constitutional symptoms, eye symptoms, ears, nose, mouth, throat symptoms, cardiovascular symptoms, respiratory symptoms, gastrointestinal symptoms, genitourinary symptoms, musculoskeletal symptoms, integumentary symptoms, neurological symptoms, psychiatric symptoms, endocrine symptoms, hematologic/lymphatic symptoms, and allergic/ immunologic symptoms.   The pertinent factors have been included in the HPI and below.  Patient Supplied Answers to Review of Systems   ENT ROS 2022   Ears, Nose, Throat: Hearing loss, Nasal congestion or drainage, Sore throat   Musculoskeletal: Back pain, Neck pain       Physical Examination   The patient underwent a physical examination as described below. The pertinent positive and negative findings are summarized after the description of the examination.  Constitutional: The patient's developmental and nutritional status was assessed. The patient's voice quality was assessed.  Head and Face: The head and face were inspected for deformities. The sinuses were palpated. The salivary glands were palpated. Facial muscle strength was  assessed bilaterally.  Eyes: Extraocular movements and primary gaze alignment were assessed.  Ears, Nose, Mouth and Throat: The ears and nose were examined for deformities. The nasal septum, mucosa, and turbinates were inspected by anterior rhinoscopy. The lips, teeth, and gums were examined for abnormalities. The oral mucosa, tongue, palate, tonsils, lateral and posterior pharynx were inspected for the presence of asymmetry or mucosal lesions.    Neck: The tracheal position was noted, and the neck mass palpated to determine if there were any asymmetries, abnormal neck masses, thyromegally, or thyroid nodules.  Respiratory: The nature of the breathing and chest expansion/symmetry was observed.  Cardiovascular: The patient was examined to determine the presence of any edema or jugular venous distension.  Abdomen: The contour of the abdomen was noted.  Lymphatic: The patient was examined for infraclavicular lymphadenopathy.  Musculoskeletal: The patient was inspected for the presence of skeletal deformities.  Extremities: The extremities were examined for any clubbing or cyanosis.  Skin: The skin was examined for inflammatory or neoplastic conditions.  Neurologic: The patient's orientation, mood, and affect were noted. The cranial nerve  functions were examined.  Other pertinent positive and negative findings on physical examination:      OC/OP: no lesions, uvula midline, soft palate elevates symmetrically  Neck: no lesions, R>L TH tenderness to palpation    All other physical examination findings were within normal limits and noncontributory.  Procedures   Flexible laryngoscopy (CPT 14714)      Pre-procedure diagnosis: dysphonia  Post-procedure diagnosis: same as above  Indication for procedure: Mr. Meadows is a 66 year old male with see above  Procedure(s): Fiberoptic Laryngoscopy    Details of Procedure: After informed consent was obtained, the patient was seated in the examination chair.  The areas of the  nasopharynx as well as the hypopharynx were anesthetized with topical 4% lidocaine with 0.25% phenylephrine atomizer.  Examination of the base of tongue was performed first.  Attention was directed to any evidence of masses in the area or evidence of leukoplakia or candidal infection.  Attention was directed to the epiglottis where its size and position was determined and its movement on phonation of the vowel  e .  The piriform sinuses were then inspected for any mass lesions or pooling of secretions.  Attention was then directed to the larynx. The vocal folds were inspected for infection or any areas of leukoplakia, for masses, polypoid degeneration, or hemorrhage.  Having done this, the arytenoids and vocal processes were inspected for erythema or evidence of granuloma formation.  The posterior commissure was then inspected for evidence of inflammatory changes in the mucosa and heaping up of mucosal tissue. The patient was then instructed to say the vowel  e .  Adduction of vocal folds to the midline was observed for any evidence of paresis or paralysis of the larynx or asymmetry in rotation of the larynx to the left or right. The patient was asked to breathe and the degree of abduction was noted bilaterally.  Subglottic view of the larynx was obtained for any additional mass lesions or mucosal changes.  Finally the post cricoid was examined for evidence of pooling of secretions, as well as the pharyngeal wall mucosa.   Anesthesia type: 0.25% phenylephrine    Findings:  Anatomic/physiological deviations: RNC, presbylarynx, mild mucus regurgitating in the left pyriform   Right vocal process: No restriction of mobility   Left vocal process: No restriction of mobility  Glottal gap: Glottal gap  Supraglottic structures: Normal  Hypopharynx: Normal     Estimated Blood Loss: minimal  Complications: None  Disposition: Patient tolerated the procedure well                  Fiberoptic Endoscopic Evaluation of Swallowing  (CPT 40056)  and Interpretation of Swallowing (CPT 99077)    Indications: See above notes for complete history and physical. Patient complains of dysphagia to both solids and liquids and/or there is suggestion on history and endoscopic exam of the presence of dysphagia causing medical complaints.  Swallowing evaluation is being performed to assess the presence and degree of dysphagia, and to recommend a safe diet.     Pulmonary Status:  No PNA   Current Diet:              regular                                        thin liquids      Consistency Amounts:  Thin Liquid: sip   Puree: 1 tsp  Solid: cookies            Positioning: upright in a chair  Oral Peripheral Exam: See physical exam section.  Anatomic Notes: See Videostroboscopy report for assessment of anatomy and laryngeal functioning  Pharyngeal secretions prior to administration of liquid or food: No  Oral Phase Abnormal Findings: No abnormal behavior observed  Behavioral Adaptations: No abnormal behavior observed  Pharyngeal Phase Abnormal Findings: very slight EPR with the first bite of puree    Recommended Diet:  regular                                        thin liquids               Review of Relevant Clinical Data   I personally reviewed:  Liliana Cisneros 4/5/22 -   Patient's sore throat started in December when he had COVID. He feels like he is swallowing pretty well now. He does not feel like food is getting stuck. He has an occasional cough and a raspy voice. Non-smoker. Has allergies and uses a saline nasal spray. He has been taking Protonix the last couple weeks but does not think it is helping much. He denies heartburn symptoms but does endorse stomach bloating and gas. Denies sinusitis, nasal drainage. He has some nasal congestion. He tries to sleep slightly elevated.    He has a history of Zenker's diverticulum and had surgery back in 2018          Notes: Video Esophagram Review Rounds  Imaging Review of MBSS conducted with attending physician  "Azra Cho and reviewed/discussed with SLP Helen Valadez, Effie Wilson, Bety Perez, and/or Smita Dow     Relevant Background:  1/30/18 ENDOSCOPIC DIVERTICULECTOMY ZENKERS     Esophagram: 4/18/22 outside zenker's     Recommendations:  Needs Xray Video Swallow Exam and office evaluation          [facility]: 6/28/22     Findings:  Pharyngeal Weakness:No  Epiglottic dysfunction: No  Penetration: No  Aspiration: No  UES dysfunction: zenker's  Details: safe swallow, zenker's - does not regurgitate        Recommendations:  Diet: current            Radiology:    Pathology:    Procedures:    Labs:  No results found for: TSH  No results found for: NA, CO2, BUN, CREAT, GLUCOSE, PHOS  No results found for: WBC, HGB, HCT, MCV, PLT  No results found for: PT, PTT, INR  No results found for: MICHAEL  No components found for: RHEUMATOIDFACTOR,  RF  No results found for: CRP  No components found for: CKTOT, URICACID  No components found for: C3, C4, DSDNAAB, NDNAABIFA  No results found for: MPOAB    Patient reported Quality of Life (QOL) Measures   Patient Supplied Answers To VHI Questionnaire  Voice Handicap Index (VHI-10) 6/23/2022   My voice makes it difficult for people to hear me 2   People have difficulty understanding me in a noisy room 2   My voice difficulties restrict my personal and social life.  2   I feel left out of conversations because of my voice 1   My voice problem causes me to lose income 0   I feel as though I have to strain to produce voice 1   The clarity of my voice is unpredictable 2   My voice problem upsets me 2   My voice makes me feel handicapped 2   People ask, \"What's wrong with your voice?\" 1   VHI-10 15         Patient Supplied Answers To EAT Questionnaire  Eating Assessment Tool (EAT-10) 6/23/2022   My swallowing problem has caused me to lose weight 0   My swallowing problem interferes with my ability to go out for meals 0   Swallowing liquids takes extra effort 0   Swallowing solids takes " extra effort 1   Swallowing pills takes extra effort 1   Swallowing is painful 1   The pleasure of eating is affected by my swallowing 1   When I swallow food sticks in my throat 0   I cough when I eat 0   Swallowing is stressful 1   EAT-10 5         Patient Supplied Answers To CSI Questionnaire  Cough Severity Index (CSI) 2022   My cough is worse when I lie down 0   My coughing problem causes me to restrict my personal and social life 0   I tend to avoid places because of my cough problem 0   I feel embarrassed because of my coughing problem 0   People ask, ''What's wrong?'' because I cough a lot 0   I run out of air when I cough 0   My coughing problem affects my voice 1   My coughing problem limits my physical activity 0   My coughing problem upsets me 0   People ask me if I am sick because I cough a lot 0   CSI Score 1         Patient Supplied Answers to Dyspnea Index Questionnaire:  No flowsheet data found.    Impression & Plan     IMPRESSION: Mr. Meadows is a 66 year old male who is being seen for the followin. 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 Meadows. If you have any questions, please do not hesitate to contact me.    Azra Cho MD    Laryngology    [facility]  Department of  Otolaryngology - Head and Neck Surgery  [facility] [place] [address]  Appointment line: [phone]  Fax: [phone]  [email or web address]       Relevant Diagnosis: Schedule Surgery first week in August, 2022 per Dr. Cho.  August  Teaching Topic: pre-op teaching  Person(s) involved in teaching:  Patient     Teaching Concerns Addressed:  Pre op teaching included the need for an H&P, NPO status pre op, hospital routines, expected recovery, activity  restrictions, antimicrobial scrub, s/s of infection, pain control methods and the importance of follow up appointments.  The patient voiced an understanding of all instructions and will call with questions.     Motivation Level:  Asks Questions:   Yes  Eager to Learn:   Yes  Cooperative:   Yes  Receptive (willing/able to accept information):   Yes     Patient  demonstrates understanding of the following:  Reason for the appointment, diagnosis and treatment plan:   Yes  Knowledge of proper use of medications and conditions for which they are ordered (with special attention to potential side effects or drug interactions):   Yes  Which situations necessitate calling provider and whom to contact:   Yes        Proper use and care of  (medical equip, care aids, etc.):   NA  Nutritional needs and diet plan:   Yes  Pain management techniques:    Yes  Patient instructed on hand hygiene:  Yes  How and/when to access community resources:   NA     Infection Prevention:  Patient   demonstrates understanding of the following:  Surgical procedure site care taught   Signs and symptoms of infection taught Yes  Wound care taught Yes     Instructional Materials Used/Given: AVS instructions      Again, thank you for allowing me to participate in the care of your patient.      Sincerely,    Azra Cho MD

## 2022-06-28 NOTE — PROGRESS NOTES
"[facility] VOICE CLINIC  Evaluation report    Clinician: Papa Fernandez M.M., M.A., CCC/SLP  Seen in conjunction with: Dr. Cho  Referring physician:  Liliana PORTILLO  Patient: Chris Meadows  Date of Visit: 6/28/2022    HISTORY  Chief complaint: Chris Meadows is a 66 year old gentleman presenting today for evaluation of dysphagia and sore throat.    Chart Review: He has a history significant for patient has a history of Zenker's diverticulum which was surgically repaired 2018.  Patient was seen on 4/5/2022 at the [facility].  At that time he reported the sensation of sore throat.  Laryngeal evaluation was within normal limits, and the patient was subsequently sent for repeat esophagram.  The results of that demonstrated \"a Zenker's diverticulum is present, centered around C6-7 and measuring about 3.6 cm and 2.8 in the AP dimension.  Contrast remained within.  Patient was subsequently referred to our clinic for further evaluation and treatment as warranted.  He presents for this today.    Of note I was unable to be present for the history gathering section of today's appointment.  Please see Dr. Cho and my colleague Smita Dow's notes for additional details in this regard.    Past Medical History:   Diagnosis Date     Allergic rhinitis, cause unspecified      Diverticulosis of colon (without mention of hemorrhage)      Hallux malleus      Intestinal disaccharidase deficiencies and disaccharide malabsorption      Osteoarthrosis, unspecified whether generalized or localized, lower leg      Other and unspecified hyperlipidemia      Unspecified sleep apnea      Past Surgical History:   Procedure Laterality Date     COMBINED REPAIR PTOSIS WITH BLEPHAROPLASTY BILATERAL  7/25/2013    Procedure: COMBINED REPAIR PTOSIS WITH BLEPHAROPLASTY BILATERAL;  BILATERAL UPPER LID BLEPHAROPLASTY AND PTOSIS REPAIR  ;  Surgeon: River Lazo MD;  Location: [facility]       OBJECTIVE  PATIENT REPORTED MEASURES  Patient " "Supplied Answers To VHI Questionnaire  Voice Handicap Index (VHI-10) 6/23/2022   My voice makes it difficult for people to hear me 2   People have difficulty understanding me in a noisy room 2   My voice difficulties restrict my personal and social life.  2   I feel left out of conversations because of my voice 1   My voice problem causes me to lose income 0   I feel as though I have to strain to produce voice 1   The clarity of my voice is unpredictable 2   My voice problem upsets me 2   My voice makes me feel handicapped 2   People ask, \"What's wrong with your voice?\" 1   VHI-10 15     Patient Supplied Answers To CSI Questionnaire  Cough Severity Index (CSI) 6/23/2022   My cough is worse when I lie down 0   My coughing problem causes me to restrict my personal and social life 0   I tend to avoid places because of my cough problem 0   I feel embarrassed because of my coughing problem 0   People ask, ''What's wrong?'' because I cough a lot 0   I run out of air when I cough 0   My coughing problem affects my voice 1   My coughing problem limits my physical activity 0   My coughing problem upsets me 0   People ask me if I am sick because I cough a lot 0   CSI Score 1     Patient Supplied Answers To EAT Questionnaire  Eating Assessment Tool (EAT-10) 6/23/2022   My swallowing problem has caused me to lose weight 0   My swallowing problem interferes with my ability to go out for meals 0   Swallowing liquids takes extra effort 0   Swallowing solids takes extra effort 1   Swallowing pills takes extra effort 1   Swallowing is painful 1   The pleasure of eating is affected by my swallowing 1   When I swallow food sticks in my throat 0   I cough when I eat 0   Swallowing is stressful 1   EAT-10 5     PERCEPTUAL EVALUATION (CPT 21743)  POSTURE / TENSION:     neck and shoulders    BREATHING:     phonation is not coordinated with respiration    LARYNGEAL PALPATION:     tenderness of the thyrohyoid area    reduced thyrohyoid " space    right greater than left    VOICE:    Roughness: Mild Intermittent    Breathiness: Minimal    Strain: Moderate Intermittent    Aesthenia: Mild to moderate Intermittent    Loudness    Conversational speech:  Moderately reduced intermittently    Projected speech:  WNL    Pitch:    Conversational speech:  WNL    Pitch glide:     Adequate access to left registration without notable breaks    Resonance:    Conversational speech:  laryngeal pharyngeal resonance    Singing vs. Speech: Decreased roughness and strain in singing versus running speech    COUGH/THROAT CLEARING:    Occasional    THERAPY PROBES: Improvement was elicited with use of forward resonant stimuli, use of clear speech protocol and coordination of respiration and phonation    LARYNGEAL EXAMINATION  Procedure: Flexible endoscopy with chip-tip technology without stroboscopy, right nostril; topical anesthesia with 3% Lidocaine and 0.25% phenylephrine was not applied.   Performed by: Dr. Azra Cho  The laryngeal and pharyngeal structures were evaluated for gross appearance, mobility, function, and focal lesions / abnormalities of the associated mucosa.    All findings were within normal limits with the exception of the following salient features:     Mild to mod pooling in left piriform sinus and to a lesser diegree postr cricoid an dright    VF appear bilaterally mildly concave     Intermittent 4 way constrictive hyperfunction in conjunction to non-optimal phonatory respiratory coordination, with frequent anterior glottal gap    Improved configuratation closure with forward resonant sounds and singing vs. Running speech    A screening swallow evaluation was completed by Dr. Cho and my colleague Smita Dow CCC-SLP    The laryngeal exam was reviewed with Mr. Meadows, and I provided pertinent explanations, as well as written and oral information.    ASSESSMENT / PLAN  IMPRESSIONS: Chris Abdiel is presenting today with throat discomfort and a  history of Zenker's diverticulum status postsurgical repair in 2018.  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  Perceptually these findings manifest as intermittent strain, asthenia, and rough voice quality, that corresponds to moments of decreased respiratory drive and coordination of airflow, glottal control, and resonance.  In absence of discoordination increased muscular recruitment is noted and this corresponds to areas of tenderness on laryngeal palpation helping to explain the patient's persistent throat pain.  In addition to these elements laryngeal evaluation also showed some regurgitation from the UES consistent with ongoing presence of Zenker's diverticulum.  Please see Dr. Cho and Smita Dow CCC-SLP's notes from today's date for additional details regarding the patient's swallowing difficulties.      STIMULABILITY: results of therapy probes during perceptual and laryngeal evaluation demonstrate improvement with use of forward resonant stimuli and coordination of respiration and phonation    DETAILED RECOMMENDATIONS:     A course of speech therapy is recommended to optimize vocal technique, improve voice quality and promote reduced discomfort, effort and fatigue.    He demonstrates a Good prognosis for improvement given adherence to therapeutic recommendations.   o Positive indicators: positive response to therapy probes diagnosis is known to respond to treatment  o Negative indicators: External locus of control    DURATION / FREQUENCY: 4 biweekly and 2 monthly one-hour sessions    Research: This patient is not a candidate.      GOALS:  Patient goal:     To understand the problem and fix it as much as possible    Short-term goal(s): Within the first 4 sessions, Mr. Meadows:    will demonstrate  assigned laryngeal massage techniques with 80% accuracy or better with no clinician support    will be able to independently list key factors in maintenance of good vocal hygiene with 80% accuracy, and report on their use outside the therapy room.    will accurately identify target vs. habitual voice quality during therapy tasks in 4 out of 5 trials with no clinician support    will demonstrate the ability to alternate between target and habitual voice quality given clinician cue 75% of the time during therapy tasks    Long-term goal(s): In 6 months, Mr. Meadows will:    Report a week of typical vocal activities, in which dysphonia and throat discomfort do not exceed a level of 2 out of 10, 80% of the time   Certification period: June 28, 2022 - 9/26/22    This treatment plan was developed with the patient who agreed with the recommendations.    TOTAL SERVICE TIME: 30 minutes  EVALUATION OF VOICE AND RESONANCE (55613)  NO CHARGE FACILITY FEE (82868)    Papa Fernandez M.M., M.A., CCC-SLP  Speech-Language Pathologist  Certificate of Vocology  [phone]    *this report was created in part through the use of computerized dictation software, and though reviewed following completion, some typographic errors may persist.  If there is confusion regarding any of this notes contents, please contact me for clarification.*

## 2022-06-28 NOTE — NURSING NOTE
"Chief Complaint   Patient presents with     Consult       Blood pressure 134/67, pulse 63, height 1.74 m (5' 8.5\"), weight 83.9 kg (185 lb), SpO2 93 %.    Rama Menchaca, EMT    "

## 2022-06-28 NOTE — PROGRESS NOTES
"     Speech-Language Pathology Department   EVALUATION  [facility]  Video Swallow Study Evaluation   And  Clinical Swallow Evaluation with Endoscopic Guidance by MD      06/28/22 1100   General Information   Type Of Visit Initial   Start Of Care Date 06/28/22   Referring Physician Dr. Azra Cho   Orders Evaluate And Treat   Orders Comment Video Swallow Study   Medical Diagnosis Dysphagia   Precautions/limitations No Known Precautions/limitations   Hearing Functional in 1:1 setting; pt wearing bilateral hearing aids   Pertinent History of Current Problem/OT: Additional Occupational Profile Info Chris Meadows is a 66 year old male with a PMH of Zenker's diverticulum s/p diverticulotomy in 2018. Reports this spring he started to have a sore throat. He completed an esophagram as part of the work up and found a recurrent Zenker's diverticulum. He presents today for video swallow study evaluation and was seen afterwards in conjunction with ENT clinic visit. Reports swallowing is \"uncomfortable\" when things get stuck.   Respiratory Status Room air   Prior Level Of Function Swallowing   Prior Level Of Function Comment Regular textures/thin liquids   General Observations Pt pleasant and cooperative throughout evaluation   Patient/family Goals To fix the Zenker's and make it go away   Clinical Swallow Evaluation   Oral Musculature generally intact   Structural Abnormalities none present   Dentition present and adequate   Mucosal Quality adequate   Mandibular Strength and Mobility intact   Oral Labial Strength and Mobility WFL   Lingual Strength and Mobility WFL   Velar Elevation intact   Buccal Strength and Mobility intact   Laryngeal Function Cough;Swallow;Voicing initiated;Throat clear   Oral Musculature Comments Generally WFL   Additional Documentation Yes   Clinical Swallow Eval: Thin Liquid Texture Trial   Mode of Presentation, Thin Liquids straw   Volume of Liquid or " Food Presented 3oz   Oral Phase of Swallow WFL   Pharyngeal Phase of Swallow intact   Diagnostic Statement PO trials evaluated under endoscopy completed by MD. No penetration/aspiration or significant residuals. No regurgitation.   Clinical Swallow Eval: Puree Solid Texture Trial   Mode of Presentation, Puree spoon;fed by clinician   Volume of Puree Presented 3 tablespoons   Oral Phase, Puree WFL   Pharyngeal Phase, Puree intact   Diagnostic Statement PO trials evaluated under endoscopy completed by MD. No penetration/aspiration or significant residuals. Minimal regurgitation appreciated into the pirifrom sinuses.   Clinical Swallow Eval: Regular (Solid)   Mode of Presentation self-fed   Volume Presented 1 Monik Doone   Oral Phase WFL   Pharyngeal Phase intact   Diagnostic Statement PO trials evaluated under endoscopy completed by MD. No penetration/aspiration or significant residuals. No regurgitation.   VFSS Evaluation   VFSS Additional Documentation Yes   VFSS Eval: Radiology   Radiologist Resident   Views Taken left lateral;A/P   Physical Location of Procedure [facility]   VFSS Eval: Thin Liquid Texture Trial   Mode of Presentation, Thin Liquid cup;self-fed   Order of Presentation Series 1,2,8,9,11  (9-tablet, 11-AP)   Preparatory Phase WFL   Oral Phase, Thin Liquid WFL   Pharyngeal Phase, Thin Liquid WFL   Rosenbek's Penetration Aspiration Scale: Thin Liquid Trial Results 2 - contrast enters airway, remains above the vocal cords, no residue remains (penetration)   Diagnostic Statement Intermittent, minimal penetration with no residuals remaining in laryngeal vestibule. No aspiration. No significant residuals. Barium tablet passed through oropharynx without difficulty. AP unremarkable oropharyngeally.   VFSS Eval: Mildly Thick Liquids    Mode of Presentation cup;self-fed   Order of Presentation Series 3   Preparatory Phase WFL   Oral Phase WFL   Pharyngeal Phase WFL   Rosenbek's Penetration Aspiration Scale  1 - no aspiration, contrast does not enter airway   Diagnostic Statement No penetration/aspiration or significant residuals.   VFSS Eval: Puree Solid Texture Trial   Mode of Presentation, Puree spoon;self-fed   Order of Presentation Series 4,5,10  (10-AP)   Preparatory Phase WFL   Oral Phase, Puree Premature pharyngeal entry   Pharyngeal Phase, Puree WFL   Rosenbek's Penetration Aspiration Scale: Puree Food Trial Results 1 - no aspiration, contrast does not enter airway   Diagnostic Statement Premature spillage to valleculae. No penetration/aspiration or significant residuals. AP unremarkable oropharyngeally. Retention oberved in cervical esophagus consistent with Zenker's diverticulum.   VFSS Eval: Regular Texture Trial (Solid)   Mode of Presentation self-fed   Order of Presentation Series 6,7   Preparatory Phase WFL   Oral Phase Premature pharyngeal entry   Pharyngeal Phase WFL   Rosenbek's Penetration Aspiration Scale 1 - no aspiration, contrast does not enter airway   Diagnostic Statement Premature spillage to valleculae. No penetration/aspiration or significant residuals.   Swallow Compensations   Swallow Compensations No compensations were used   Educational Assessment   Barriers to Learning No barriers   Esophageal Phase of Swallow   Patient reports or presents with symptoms of esophageal dysphagia Yes   Esophageal comments Known Zenker's diverticulum   Swallow Eval: Clinical Impressions   Skilled Criteria for Therapy Intervention No problems identified which require skilled intervention   Dysphagia Outcome Severity Scale (BLANCA) Level 6 - BLANCA   Treatment Diagnosis Safe, functional oropharyngeal swallow; esophageal dysphagia d/t Zenker's diverticulum   Diet texture recommendations Regular diet;Thin liquids (level 0)   Recommended Feeding/Eating Techniques alternate between small bites and sips of food/liquid;maintain upright posture during/after eating for 30 mins;small sips/bites   Predicted Duration of  Therapy Intervention (days/wks) Evaluation only today; may benefit from re-evaluation after surgery if dysphagia symptoms persist.   Anticipated Discharge Disposition home   Risks and Benefits of Treatment have been explained. Yes   Patient, family and/or staff in agreement with Plan of Care Yes   Clinical Impression Comments Pt presents today with a safe, functional oropharyngeal swallow; esophageal dysphagia d/t Zenker's diverticulum. Oral motor examination reveals some intermittent regurgitation of saliva into the piriforms on MD scope today; otherwise WFL. Pt participated in video swallow study and PO trials under endoscopy completed by MD with similar findings. Prompt swallow response with adequate airway protection. Minimal penetration of thin liquids with no residuals remaining in laryngeal vestibule. No aspiration. No significant residuals with any PO trial textures. Barium tablet passed through oropharynx without difficulty. AP unremarkable oropharyngeally. Remaining contrast observed in upper cervical esophagus consistent with Zenker's diverticulum; please see Radiologist's report for details. Pt will undergo surgery for Zenker's diverticulum. Recommend pt continue with regular, moist textures and thin liquids with use of general safe swallow strategies until surgery. If dysphagia symptoms persist post op, he would benefit from re-evaluation.   Total Session Time   SLP Eval: oral/pharyngeal swallow function, clinical minutes (22023) 20   SLP Eval: VideoFluoroscopic Swallow function Minutes (01021) 25   Total Evaluation Time 45     Thank you for the referral of Chris Meadows. If you have any questions about this report, please contact me using the information below.     JASSI Rodriguez. (TANNER hunter, CCC-SLP   Speech Language Pathologist  ARTHUR Trained Vocologist   [facility]  Dept. of Otolaryngology  Department of Rehabilitation Services  [address] [place] [address]   [place] [address]  Email: [email or web address]   Pronouns: she/her/hers

## 2022-06-28 NOTE — PROGRESS NOTES
Video Esophagram Review Rounds  Imaging Review of MBSS conducted with attending physician Azra Cho and reviewed/discussed with SLP Helen Valadez, Effie Wilson, Bety Perez, and/or Smita Dow    Relevant Background:    MBSS Date: 6/28/22    Findings:  Pharyngeal Weakness:No  Epiglottic dysfunction: No  Penetration: No  Aspiration: No  UES dysfunction: zenker's  Details: safe swallow, zenker's - does not regurgitate      Recommendations:  Diet: current

## 2022-06-28 NOTE — PROGRESS NOTES
Relevant Diagnosis: Schedule Surgery first week in August, 2022 per Dr. Cho.  August  Teaching Topic: pre-op teaching  Person(s) involved in teaching:  Patient     Teaching Concerns Addressed:  Pre op teaching included the need for an H&P, NPO status pre op, hospital routines, expected recovery, activity  restrictions, antimicrobial scrub, s/s of infection, pain control methods and the importance of follow up appointments.  The patient voiced an understanding of all instructions and will call with questions.     Motivation Level:  Asks Questions:   Yes  Eager to Learn:   Yes  Cooperative:   Yes  Receptive (willing/able to accept information):   Yes     Patient  demonstrates understanding of the following:  Reason for the appointment, diagnosis and treatment plan:   Yes  Knowledge of proper use of medications and conditions for which they are ordered (with special attention to potential side effects or drug interactions):   Yes  Which situations necessitate calling provider and whom to contact:   Yes        Proper use and care of  (medical equip, care aids, etc.):   NA  Nutritional needs and diet plan:   Yes  Pain management techniques:   Yes  Patient instructed on hand hygiene:  Yes  How and/when to access community resources:   NA     Infection Prevention:  Patient   demonstrates understanding of the following:  Surgical procedure site care taught   Signs and symptoms of infection taught Yes  Wound care taught Yes     Instructional Materials Used/Given: AVS instructions

## 2022-06-29 ENCOUNTER — TELEPHONE (OUTPATIENT)
Dept: OTOLARYNGOLOGY | Facility: CLINIC | Age: 67
End: 2022-06-29

## 2022-06-29 NOTE — TELEPHONE ENCOUNTER
Called patient to schedule MYOTOMY, CRICOPHARYNGEAL, endoscopic with carbon dioxide laser, flexible esophagoscopy and balloon dilation (N/A)  with Dr. Cho on 8/3/2022. Let them know he needs  To get a PCR test within 4 days of surgery. I offered to schedule it for him but he wanted to schedule it himself. and to get a pre-op appointment with her PCP up to 30 days before surgery. Filled out and sent surgery packet to patient. Scheduled 4 week post op appointment. Gave patient the clinic phone number if they has any further questions.    Ana Cristina Smith, Procedure

## 2022-07-04 NOTE — PROGRESS NOTES
Outpatient Speech Language Therapy Evaluation  PLAN OF TREATMENT FOR OUTPATIENT REHABILITATION  (COMPLETE FOR INITIAL CLAIMS ONLY)  Patient's Last Name, First Name, M.I.  YOB: 1955  Chris Meadows                           Provider's Name  Colin Fernandez, SLP Medical Record No.  [id number]                               Onset Date:  6/28/22   Start of Care Date: 6/28/22     Type: Speech Language Therapy Medical Diagnosis: No primary diagnosis found.                        Therapy Diagnosis:  No primary diagnosis found.   Visits from SOC:  1   _________________________________________________________________________________  Plan of Treatment:   Speech therapy    DETAILED RECOMMENDATIONS:     A course of speech therapy is recommended to optimize vocal technique, improve voice quality and promote reduced discomfort, effort and fatigue.    He demonstrates a Good prognosis for improvement given adherence to therapeutic recommendations.   o Positive indicators: positive response to therapy probes diagnosis is known to respond to treatment  o Negative indicators: External locus of control    DURATION / FREQUENCY: 4 biweekly and 2 monthly one-hour sessions    Research: This patient is not a candidate.      GOALS:  Patient goal:     To understand the problem and fix it as much as possible    Short-term goal(s): Within the first 4 sessions, Mr. Medaows:    will demonstrate assigned laryngeal massage techniques with 80% accuracy or better with no clinician support    will be able to independently list key factors in maintenance of good vocal hygiene with 80% accuracy, and report on their use outside the therapy room.    will accurately identify target vs. habitual voice quality during therapy tasks in 4 out of 5 trials with no clinician support    will demonstrate the ability to alternate between target and habitual voice quality  given clinician cue 75% of the time during therapy tasks    Long-term goal(s): In 6 months, Mr. Meadows will:    Report a week of typical vocal activities, in which dysphonia and throat discomfort do not exceed a level of 2 out of 10, 80% of the time     _________________________________________________________________________________    I CERTIFY THE NEED FOR THESE SERVICES FURNISHED UNDER        THIS PLAN OF TREATMENT AND WHILE UNDER MY CARE     (Physician attestation of this document indicates review and certification of the therapy plan).     Certification date from: 6/28/22  Certification date to: 9/26/22    Referring Provider: Azra Cho

## 2022-07-29 ENCOUNTER — TELEPHONE (OUTPATIENT)
Dept: OTOLARYNGOLOGY | Facility: CLINIC | Age: 67
End: 2022-07-29

## 2022-08-01 ENCOUNTER — TELEPHONE (OUTPATIENT)
Dept: OTOLARYNGOLOGY | Facility: CLINIC | Age: 67
End: 2022-08-01

## 2022-08-01 NOTE — TELEPHONE ENCOUNTER
Patient can reschedule 2 of the appointments from the 9-28, 10-12, 10-26. This would push it out to November. And then have the 3rd reschedule with a different provider. Or reschedule all with a different provider, because unfortunately Colin Fernandez will no longer we a provider as of November 24th.

## 2022-08-02 ENCOUNTER — ANESTHESIA EVENT (OUTPATIENT)
Dept: SURGERY | Facility: CLINIC | Age: 67
End: 2022-08-02
Payer: MEDICARE

## 2022-08-02 ENCOUNTER — TELEPHONE (OUTPATIENT)
Dept: OTOLARYNGOLOGY | Facility: CLINIC | Age: 67
End: 2022-08-02

## 2022-08-02 NOTE — TELEPHONE ENCOUNTER
[facility] Call Center    Phone Message    May a detailed message be left on voicemail: yes     Reason for Call: Other:   Pt states that Rebecca was going to have someone verify that medicare will cover his procedures. Pt is following up on that because pt hasn't heard back. Please call Pt back to update.     Action Taken: Other:  ent    Travel Screening: Not Applicable

## 2022-08-02 NOTE — TELEPHONE ENCOUNTER
"\"I don't know who they asked about it but it is a covered service through Medicare per the Center of Medicare and Medicaid Services. [employer] reached out and no authorization is required for coverage either. \" per Michael Tamez    Called pt and let him know. No further questions.    Katie Jordan, EDEL    "

## 2022-08-03 ENCOUNTER — HOSPITAL ENCOUNTER (OUTPATIENT)
Facility: CLINIC | Age: 67
Setting detail: OBSERVATION
LOS: 1 days | Discharge: HOME | End: 2022-08-04
Attending: OTOLARYNGOLOGY | Admitting: OTOLARYNGOLOGY
Payer: MEDICARE

## 2022-08-03 ENCOUNTER — ANESTHESIA (OUTPATIENT)
Dept: SURGERY | Facility: CLINIC | Age: 67
End: 2022-08-03
Payer: MEDICARE

## 2022-08-03 DIAGNOSIS — K22.5 ZENKER'S DIVERTICULUM: Primary | ICD-10-CM

## 2022-08-03 DIAGNOSIS — K22.5 ZENKER'S (HYPOPHARYNGEAL) DIVERTICULUM: ICD-10-CM

## 2022-08-03 LAB
ALBUMIN SERPL BCG-MCNC: 4.2 G/DL (ref 3.5–5.2)
ALP SERPL-CCNC: 65 U/L (ref 40–129)
ALT SERPL W P-5'-P-CCNC: 20 U/L (ref 10–50)
ANION GAP SERPL CALCULATED.3IONS-SCNC: 9 MMOL/L (ref 7–15)
AST SERPL W P-5'-P-CCNC: 27 U/L (ref 10–50)
BASE EXCESS BLDV CALC-SCNC: -0.5 MMOL/L (ref -7.7–1.9)
BILIRUB SERPL-MCNC: 0.6 MG/DL
BUN SERPL-MCNC: 17.5 MG/DL (ref 8–23)
CALCIUM SERPL-MCNC: 8.8 MG/DL (ref 8.8–10.2)
CHLORIDE SERPL-SCNC: 104 MMOL/L (ref 98–107)
CREAT SERPL-MCNC: 0.95 MG/DL (ref 0.67–1.17)
DEPRECATED HCO3 PLAS-SCNC: 22 MMOL/L (ref 22–29)
GFR SERPL CREATININE-BSD FRML MDRD: 89 ML/MIN/1.73M2
GLUCOSE BLDC GLUCOMTR-MCNC: 113 MG/DL (ref 70–99)
GLUCOSE SERPL-MCNC: 113 MG/DL (ref 70–99)
HCO3 BLDV-SCNC: 24 MMOL/L (ref 21–28)
MAGNESIUM SERPL-MCNC: 2.1 MG/DL (ref 1.7–2.3)
O2/TOTAL GAS SETTING VFR VENT: 21 %
PCO2 BLDV: 37 MM HG (ref 40–50)
PH BLDV: 7.41 [PH] (ref 7.32–7.43)
PO2 BLDV: 72 MM HG (ref 25–47)
POTASSIUM SERPL-SCNC: 5 MMOL/L (ref 3.4–5.3)
PROT SERPL-MCNC: 6.5 G/DL (ref 6.4–8.3)
SODIUM SERPL-SCNC: 135 MMOL/L (ref 136–145)

## 2022-08-03 PROCEDURE — 250N000011 HC RX IP 250 OP 636

## 2022-08-03 PROCEDURE — 80053 COMPREHEN METABOLIC PANEL: CPT | Performed by: ANESTHESIOLOGY

## 2022-08-03 PROCEDURE — 272N000001 HC OR GENERAL SUPPLY STERILE: Performed by: OTOLARYNGOLOGY

## 2022-08-03 PROCEDURE — 250N000025 HC SEVOFLURANE, PER MIN: Performed by: OTOLARYNGOLOGY

## 2022-08-03 PROCEDURE — 710N000010 HC RECOVERY PHASE 1, LEVEL 2, PER MIN: Performed by: OTOLARYNGOLOGY

## 2022-08-03 PROCEDURE — C1726 CATH, BAL DIL, NON-VASCULAR: HCPCS | Performed by: OTOLARYNGOLOGY

## 2022-08-03 PROCEDURE — 93010 ELECTROCARDIOGRAM REPORT: CPT | Mod: 76 | Performed by: INTERNAL MEDICINE

## 2022-08-03 PROCEDURE — 43180 ESOPHAGOSCOPY RIGID TRNSO: CPT | Mod: GC | Performed by: OTOLARYNGOLOGY

## 2022-08-03 PROCEDURE — 370N000017 HC ANESTHESIA TECHNICAL FEE, PER MIN: Performed by: OTOLARYNGOLOGY

## 2022-08-03 PROCEDURE — 250N000011 HC RX IP 250 OP 636: Performed by: OTOLARYNGOLOGY

## 2022-08-03 PROCEDURE — 99203 OFFICE O/P NEW LOW 30 MIN: CPT | Performed by: NURSE PRACTITIONER

## 2022-08-03 PROCEDURE — 258N000003 HC RX IP 258 OP 636: Performed by: NURSE ANESTHETIST, CERTIFIED REGISTERED

## 2022-08-03 PROCEDURE — 360N000077 HC SURGERY LEVEL 4, PER MIN: Performed by: OTOLARYNGOLOGY

## 2022-08-03 PROCEDURE — 258N000003 HC RX IP 258 OP 636: Performed by: OTOLARYNGOLOGY

## 2022-08-03 PROCEDURE — 272N000002 HC OR SUPPLY OTHER OPNP: Performed by: OTOLARYNGOLOGY

## 2022-08-03 PROCEDURE — 250N000009 HC RX 250: Performed by: OTOLARYNGOLOGY

## 2022-08-03 PROCEDURE — 999N000141 HC STATISTIC PRE-PROCEDURE NURSING ASSESSMENT: Performed by: OTOLARYNGOLOGY

## 2022-08-03 PROCEDURE — 93005 ELECTROCARDIOGRAM TRACING: CPT

## 2022-08-03 PROCEDURE — 36415 COLL VENOUS BLD VENIPUNCTURE: CPT | Performed by: ANESTHESIOLOGY

## 2022-08-03 PROCEDURE — G0378 HOSPITAL OBSERVATION PER HR: HCPCS

## 2022-08-03 PROCEDURE — 250N000009 HC RX 250: Performed by: NURSE ANESTHETIST, CERTIFIED REGISTERED

## 2022-08-03 PROCEDURE — 83735 ASSAY OF MAGNESIUM: CPT | Performed by: STUDENT IN AN ORGANIZED HEALTH CARE EDUCATION/TRAINING PROGRAM

## 2022-08-03 PROCEDURE — 82803 BLOOD GASES ANY COMBINATION: CPT | Performed by: ANESTHESIOLOGY

## 2022-08-03 PROCEDURE — 250N000011 HC RX IP 250 OP 636: Performed by: NURSE ANESTHETIST, CERTIFIED REGISTERED

## 2022-08-03 RX ORDER — AMPICILLIN AND SULBACTAM 1; .5 G/1; G/1
1.5 INJECTION, POWDER, FOR SOLUTION INTRAMUSCULAR; INTRAVENOUS SEE ADMIN INSTRUCTIONS
Status: DISCONTINUED | OUTPATIENT
Start: 2022-08-03 | End: 2022-08-03 | Stop reason: HOSPADM

## 2022-08-03 RX ORDER — AMPICILLIN AND SULBACTAM 2; 1 G/1; G/1
3 INJECTION, POWDER, FOR SOLUTION INTRAMUSCULAR; INTRAVENOUS
Status: DISCONTINUED | OUTPATIENT
Start: 2022-08-03 | End: 2022-08-03 | Stop reason: HOSPADM

## 2022-08-03 RX ORDER — FENTANYL CITRATE 50 UG/ML
25 INJECTION, SOLUTION INTRAMUSCULAR; INTRAVENOUS EVERY 5 MIN PRN
Status: DISCONTINUED | OUTPATIENT
Start: 2022-08-03 | End: 2022-08-03 | Stop reason: HOSPADM

## 2022-08-03 RX ORDER — LIDOCAINE HYDROCHLORIDE 20 MG/ML
INJECTION, SOLUTION INFILTRATION; PERINEURAL PRN
Status: DISCONTINUED | OUTPATIENT
Start: 2022-08-03 | End: 2022-08-03

## 2022-08-03 RX ORDER — HYDROMORPHONE HCL IN WATER/PF 6 MG/30 ML
.02-.3 PATIENT CONTROLLED ANALGESIA SYRINGE INTRAVENOUS EVERY 4 HOURS PRN
Status: DISCONTINUED | OUTPATIENT
Start: 2022-08-03 | End: 2022-08-04

## 2022-08-03 RX ORDER — SODIUM CHLORIDE, SODIUM LACTATE, POTASSIUM CHLORIDE, CALCIUM CHLORIDE 600; 310; 30; 20 MG/100ML; MG/100ML; MG/100ML; MG/100ML
INJECTION, SOLUTION INTRAVENOUS CONTINUOUS
Status: DISCONTINUED | OUTPATIENT
Start: 2022-08-03 | End: 2022-08-04

## 2022-08-03 RX ORDER — OXYCODONE HYDROCHLORIDE 5 MG/1
5 TABLET ORAL EVERY 4 HOURS PRN
Status: DISCONTINUED | OUTPATIENT
Start: 2022-08-03 | End: 2022-08-03

## 2022-08-03 RX ORDER — ONDANSETRON 4 MG/1
4 TABLET, ORALLY DISINTEGRATING ORAL EVERY 30 MIN PRN
Status: DISCONTINUED | OUTPATIENT
Start: 2022-08-03 | End: 2022-08-03 | Stop reason: HOSPADM

## 2022-08-03 RX ORDER — HYDROMORPHONE HCL IN WATER/PF 6 MG/30 ML
0.2 PATIENT CONTROLLED ANALGESIA SYRINGE INTRAVENOUS EVERY 5 MIN PRN
Status: DISCONTINUED | OUTPATIENT
Start: 2022-08-03 | End: 2022-08-03 | Stop reason: HOSPADM

## 2022-08-03 RX ORDER — DEXAMETHASONE SODIUM PHOSPHATE 4 MG/ML
INJECTION, SOLUTION INTRA-ARTICULAR; INTRALESIONAL; INTRAMUSCULAR; INTRAVENOUS; SOFT TISSUE PRN
Status: DISCONTINUED | OUTPATIENT
Start: 2022-08-03 | End: 2022-08-03

## 2022-08-03 RX ORDER — ACETAMINOPHEN 650 MG/1
650 SUPPOSITORY RECTAL EVERY 4 HOURS PRN
Status: DISCONTINUED | OUTPATIENT
Start: 2022-08-03 | End: 2022-08-04

## 2022-08-03 RX ORDER — PROPOFOL 10 MG/ML
INJECTION, EMULSION INTRAVENOUS PRN
Status: DISCONTINUED | OUTPATIENT
Start: 2022-08-03 | End: 2022-08-03

## 2022-08-03 RX ORDER — ONDANSETRON 2 MG/ML
4 INJECTION INTRAMUSCULAR; INTRAVENOUS EVERY 6 HOURS PRN
Status: DISCONTINUED | OUTPATIENT
Start: 2022-08-03 | End: 2022-08-04 | Stop reason: HOSPADM

## 2022-08-03 RX ORDER — SODIUM CHLORIDE, SODIUM LACTATE, POTASSIUM CHLORIDE, CALCIUM CHLORIDE 600; 310; 30; 20 MG/100ML; MG/100ML; MG/100ML; MG/100ML
INJECTION, SOLUTION INTRAVENOUS CONTINUOUS PRN
Status: DISCONTINUED | OUTPATIENT
Start: 2022-08-03 | End: 2022-08-03

## 2022-08-03 RX ORDER — OXYMETAZOLINE HYDROCHLORIDE 0.05 G/100ML
SPRAY NASAL PRN
Status: DISCONTINUED | OUTPATIENT
Start: 2022-08-03 | End: 2022-08-03 | Stop reason: HOSPADM

## 2022-08-03 RX ORDER — FENTANYL CITRATE 50 UG/ML
INJECTION, SOLUTION INTRAMUSCULAR; INTRAVENOUS PRN
Status: DISCONTINUED | OUTPATIENT
Start: 2022-08-03 | End: 2022-08-03

## 2022-08-03 RX ORDER — ONDANSETRON 2 MG/ML
INJECTION INTRAMUSCULAR; INTRAVENOUS PRN
Status: DISCONTINUED | OUTPATIENT
Start: 2022-08-03 | End: 2022-08-03

## 2022-08-03 RX ORDER — ONDANSETRON 2 MG/ML
4 INJECTION INTRAMUSCULAR; INTRAVENOUS EVERY 30 MIN PRN
Status: DISCONTINUED | OUTPATIENT
Start: 2022-08-03 | End: 2022-08-03 | Stop reason: HOSPADM

## 2022-08-03 RX ORDER — SODIUM CHLORIDE, SODIUM LACTATE, POTASSIUM CHLORIDE, CALCIUM CHLORIDE 600; 310; 30; 20 MG/100ML; MG/100ML; MG/100ML; MG/100ML
INJECTION, SOLUTION INTRAVENOUS CONTINUOUS
Status: DISCONTINUED | OUTPATIENT
Start: 2022-08-03 | End: 2022-08-03

## 2022-08-03 RX ADMIN — FENTANYL CITRATE 25 MCG: 50 INJECTION, SOLUTION INTRAMUSCULAR; INTRAVENOUS at 12:47

## 2022-08-03 RX ADMIN — PHENYLEPHRINE HYDROCHLORIDE 100 MCG: 10 INJECTION INTRAVENOUS at 11:23

## 2022-08-03 RX ADMIN — HYDROMORPHONE HYDROCHLORIDE 0.2 MG: 0.2 INJECTION, SOLUTION INTRAMUSCULAR; INTRAVENOUS; SUBCUTANEOUS at 15:48

## 2022-08-03 RX ADMIN — SODIUM CHLORIDE, POTASSIUM CHLORIDE, SODIUM LACTATE AND CALCIUM CHLORIDE: 600; 310; 30; 20 INJECTION, SOLUTION INTRAVENOUS at 10:40

## 2022-08-03 RX ADMIN — PROPOFOL 200 MG: 10 INJECTION, EMULSION INTRAVENOUS at 10:39

## 2022-08-03 RX ADMIN — HYDROMORPHONE HYDROCHLORIDE 0.2 MG: 0.2 INJECTION, SOLUTION INTRAMUSCULAR; INTRAVENOUS; SUBCUTANEOUS at 16:10

## 2022-08-03 RX ADMIN — PHENYLEPHRINE HYDROCHLORIDE 100 MCG: 10 INJECTION INTRAVENOUS at 11:12

## 2022-08-03 RX ADMIN — SODIUM CHLORIDE, POTASSIUM CHLORIDE, SODIUM LACTATE AND CALCIUM CHLORIDE: 600; 310; 30; 20 INJECTION, SOLUTION INTRAVENOUS at 13:45

## 2022-08-03 RX ADMIN — ONDANSETRON 4 MG: 2 INJECTION INTRAMUSCULAR; INTRAVENOUS at 12:02

## 2022-08-03 RX ADMIN — ROCURONIUM BROMIDE 10 MG: 50 INJECTION, SOLUTION INTRAVENOUS at 11:39

## 2022-08-03 RX ADMIN — SUGAMMADEX 200 MG: 100 INJECTION, SOLUTION INTRAVENOUS at 12:05

## 2022-08-03 RX ADMIN — ROCURONIUM BROMIDE 50 MG: 50 INJECTION, SOLUTION INTRAVENOUS at 10:40

## 2022-08-03 RX ADMIN — HYDROMORPHONE HYDROCHLORIDE 0.3 MG: 0.2 INJECTION, SOLUTION INTRAMUSCULAR; INTRAVENOUS; SUBCUTANEOUS at 17:19

## 2022-08-03 RX ADMIN — FENTANYL CITRATE 25 MCG: 50 INJECTION, SOLUTION INTRAMUSCULAR; INTRAVENOUS at 13:07

## 2022-08-03 RX ADMIN — DEXAMETHASONE SODIUM PHOSPHATE 10 MG: 4 INJECTION, SOLUTION INTRA-ARTICULAR; INTRALESIONAL; INTRAMUSCULAR; INTRAVENOUS; SOFT TISSUE at 10:59

## 2022-08-03 RX ADMIN — FENTANYL CITRATE 75 MCG: 50 INJECTION, SOLUTION INTRAMUSCULAR; INTRAVENOUS at 10:39

## 2022-08-03 RX ADMIN — PHENYLEPHRINE HYDROCHLORIDE 100 MCG: 10 INJECTION INTRAVENOUS at 10:43

## 2022-08-03 RX ADMIN — AMPICILLIN SODIUM AND SULBACTAM SODIUM 3 G: 1; .5 INJECTION, POWDER, FOR SOLUTION INTRAMUSCULAR; INTRAVENOUS at 10:51

## 2022-08-03 RX ADMIN — PHENYLEPHRINE HYDROCHLORIDE 0.3 MCG/KG/MIN: 10 INJECTION INTRAVENOUS at 11:39

## 2022-08-03 RX ADMIN — LIDOCAINE HYDROCHLORIDE 80 MG: 20 INJECTION, SOLUTION INFILTRATION; PERINEURAL at 10:39

## 2022-08-03 RX ADMIN — FENTANYL CITRATE 25 MCG: 50 INJECTION, SOLUTION INTRAMUSCULAR; INTRAVENOUS at 12:28

## 2022-08-03 ASSESSMENT — ACTIVITIES OF DAILY LIVING (ADL)
ADLS_ACUITY_SCORE: 35
ADLS_ACUITY_SCORE: 35

## 2022-08-03 NOTE — ANESTHESIA PROCEDURE NOTES
Airway       Patient location during procedure: OR       Procedure Start/Stop Times: 8/3/2022 10:45 AM  Staff -        CRNA: Antonina Crockett APRN CRNA       Performed By: CRNA  Consent for Airway        Urgency: elective  Indications and Patient Condition       Indications for airway management: olivier-procedural       Induction type:intravenous       Mask difficulty assessment: 1 - vent by mask    Final Airway Details       Final airway type: endotracheal airway       Successful airway: ETT - single and Laser  Endotracheal Airway Details        ETT size (mm): 5.5       Cuffed: yes       Successful intubation technique: direct laryngoscopy       DL Blade Type: Le 2       Grade View of Cords: 1       Adjucts: stylet    Post intubation assessment        Placement verified by: capnometry, equal breath sounds and chest rise        Number of attempts at approach: 1       Ease of procedure: easy       Dentition: Intact    Medication(s) Administered   Medication Administration Time: 8/3/2022 10:45 AM

## 2022-08-03 NOTE — PROVIDER NOTIFICATION
Time of notification: 1:04 PM  Provider notified: Willie Alvarez MD - otolaryn. resident  Patient status: PACU      Pt. B. Morneau - PACU - anesthesia would like pt to go to monitored bed with tele; could you place new transfer order? Eleanor RN *[id number]    Temp:  [97  F (36.1  C)-98.3  F (36.8  C)] 97  F (36.1  C)  Pulse:  [45-86] 86  Resp:  [15-25] 17  BP: (108-146)/(65-88) 125/77  SpO2:  [97 %-99 %] 97 %

## 2022-08-03 NOTE — ANESTHESIA POSTPROCEDURE EVALUATION
Patient: Chris Meadows    Procedure: Procedure(s):  MYOTOMY, CRICOPHARYNGEAL,  endoscopic with carbon dioxide laser  flexible esophagoscopy and balloon dilation       Anesthesia Type:  General    Note:  Disposition: Inpatient   Postop Pain Control: Uneventful            Sign Out: Well controlled pain   PONV: No   Neuro/Psych: Uneventful            Sign Out: Acceptable/Baseline neuro status   Airway/Respiratory: Uneventful            Sign Out: Acceptable/Baseline resp. status   CV/Hemodynamics: Uneventful            Sign Out: Acceptable CV status; No obvious hypovolemia; No obvious fluid overload   Other NRE: NONE   DID A NON-ROUTINE EVENT OCCUR? No    Event details/Postop Comments:  The patient had significant sinus arrhythmia with premature atrial beats. Electrolytes and blood gas was normal. The patient was hemodynamically stable and converted to baseline sinus bradycardia without any intervention. He is being transferred to cardiac telemetry floor for overnight monitoring. Cardiology team consulted.            Last vitals:  Vitals Value Taken Time   /73 08/03/22 1345   Temp 36.1  C (97  F) 08/03/22 1230   Pulse 62 08/03/22 1357   Resp 15 08/03/22 1357   SpO2 98 % 08/03/22 1357   Vitals shown include unvalidated device data.    Electronically Signed By: Barbara Mckeon MD  August 3, 2022  1:58 PM

## 2022-08-03 NOTE — OP NOTE
Operative Note   Otolaryngology - Head and Neck Surgery       DATE OF OPERATION:   August 3, 2022    PREOPERATIVE DIAGNOSIS:   Dysphagia  Zenker's diverticulum     POSTOPERATIVE DIAGNOSIS:   Same    NAME OF OPERATION:   1. Zenker's diverticulectomy, endoscopic, using the CO2 laser   2. Transoral flexible esophagoscopy with dilation using a CRE balloon to 16.5 mmHg.    ANESTHESIA  Type: General  ETT: 5.5 laser tenax    SURGEON:   Azra Cho MD    RESIDENT SURGEON(S):   Willie Alvarez MD    INDICATIONS FOR PROCEDURE:   The patient is a 66 year old male with a long-standing Zenker's diverticulum. He had a prior stapler diverticulectomy in 2018. The patient comes in for diverticulectomy.The risks and benefits of the surgery was discussed including but not limited to bleeding, infection, subcutaneous emphysema, esophageal perforation with complications, extended hospital stay, and need for reoperation. The patient wishes to proceed with surgery and has signed an informed consent.     FINDINGS:   1. Exposure was achieved with the weerda laryngoscope  2. Debris seen in the pouch  3. Staples seen as the myotomy was began  4. Dilated to16.5mmHg     DESCRIPTION OF PROCEDURE:   The patient was brought into the operating room and placed supine on the operating room table. A time-out was performed. General anesthesia was induced. The patient was an easy mask ventilated. Then the patient was intubated with a mac blade and 5.5 laser tenax ETT.     Then the patient was turned 90 degrees from the anesthesiologist. The head was drapped in the usual fashion. Then the dentition and mucosa were inspected prior to start. A reinforced tooth guard was placed on the upper dentition. The weerda laryngoscope was carefully introduced into the oral cavity and gently passed to the oropharynx. Here the postcricoid area and the  Zenker's diverticulum was exposed. Photodocumentation was performed using a 0 degree Barker yumiko telescope.     Proper  positioning was confirmed by performing a flexible esophagoscopy. A flexible esophagoscope was inserted through the oral cavity to the cricopharyngeus muscle and further to the body of the esophagus, then to the lower esophageal sphincter.  The stomach was visualized upon passing through the LES.  The findings were as follows: no lesions, no irregular z-line or shot segment Barretts, no hiatal hernia.   Esophagoscopy was performed one more time to ensure there was no complications such as a false tract.     A laser time out was then performed and the patient was appropriately protected, the safety protocols were confirmed and the FiO2 was brought down to a minimal setting. A saline soaked pledget was placed in the esophagus. Then the microscope was brought into the field and using the AccuBlade CO2 laser on a linear mode  Zenker's parting wall was divided completely. Staples were seen and removed      Following this, a CRE balloon was used and dilated the esophagus to 16.5 mmHg. This is done to fully open the pharyngoesophagus and maximize treatment efficacy.    We performed esophagoscopy one more time to ensure there was no complications such as a false tract.     Photodocumentation was again performed using a 0 degree Barker yumiko telescope.     This was the end of our procedure. Afrin soaked pledgets were applied to the surgical site for hemostasis.  The surgical site was then inspected and no residual bleeding was seen. The patient was taken out of suspension. The instrumentation was carefully removed, examining the mucosa and dentition on the way it. The dental guard was removed, and the mucosa and dentition were seen to be in their preoperative state at the conclusion of the procedure. The patient was then handed back to the care of anesthesia who awoke and extubated the patient without complication.    COMPLICATIONS:   None.  .   ESTIMATED BLOOD LOSS:   Less than 10cc    DISPOSITION:   PACU.    SPECIMENS:  *  No specimens in log *       PHOTODOCUMENTATION:          v

## 2022-08-03 NOTE — PROVIDER NOTIFICATION
Time of notification: 8379  Provider notified: Dr. Mckeon - PAR doc  Patient status: PACU      Pt in new onset a. Fib in the 60s-70s; asymptomatic. No hx of afib. BP stable. Provider ordered EKG and labs.    Temp:  [97  F (36.1  C)-98.3  F (36.8  C)] 97  F (36.1  C)  Pulse:  [45-86] 76  Resp:  [10-27] 12  BP: (107-160)/(34-88) 114/60  SpO2:  [95 %-99 %] 95 %    Ashleigh Boyle RN on 8/3/2022 at 1:06 PM

## 2022-08-03 NOTE — ANESTHESIA PREPROCEDURE EVALUATION
Anesthesia Pre-Procedure Evaluation    Patient: Chris Meadows   MRN: [id number] : 1955        Procedure : Procedure(s):  MYOTOMY, CRICOPHARYNGEAL,  endoscopic with carbon dioxide laser  flexible esophagoscopy and balloon dilation          Past Medical History:   Diagnosis Date     Allergic rhinitis, cause unspecified      Diverticulosis of colon (without mention of hemorrhage)      Hallux malleus      Intestinal disaccharidase deficiencies and disaccharide malabsorption      Osteoarthrosis, unspecified whether generalized or localized, lower leg      Other and unspecified hyperlipidemia      Unspecified sleep apnea       Past Surgical History:   Procedure Laterality Date     COMBINED REPAIR PTOSIS WITH BLEPHAROPLASTY BILATERAL  2013    Procedure: COMBINED REPAIR PTOSIS WITH BLEPHAROPLASTY BILATERAL;  BILATERAL UPPER LID BLEPHAROPLASTY AND PTOSIS REPAIR  ;  Surgeon: River Lazo MD;  [place] [facility] [place]      Allergies   Allergen Reactions     Vicodin [Hydrocodone-Acetaminophen] Unknown      Social History     Tobacco Use     Smoking status: Former Smoker     Quit date: 1988     Years since quittin.0     Smokeless tobacco: Never Used   Substance Use Topics     Alcohol use: No      Wt Readings from Last 1 Encounters:   22 83.9 kg (185 lb)        Anesthesia Evaluation   Pt has had prior anesthetic. Type: General and MAC.    History of anesthetic complications   foot drop after previous knee surgery.    ROS/MED HX  ENT/Pulmonary: Comment: Dysphonia    (+) sleep apnea (resolved after losing 50 lbs), allergic rhinitis,     Neurologic:       Cardiovascular:     (+) Dyslipidemia -----    METS/Exercise Tolerance:     Hematologic:       Musculoskeletal:   (+) arthritis,     GI/Hepatic: Comment: Diverticulosis  Hx Zenker's diverticulum s/p endoscopic Zenker's diverticulectomy  Dysphagia      Renal/Genitourinary:  - neg Renal ROS     Endo:  - neg endo ROS     Psychiatric/Substance Use:        Infectious Disease:  - neg infectious disease ROS     Malignancy:  - neg malignancy ROS     Other: Comment: Intestinal disaccharidase deficiencies and disaccharide malabsorption              OUTSIDE LABS:  CBC: No results found for: WBC, HGB, HCT, PLT  BMP: No results found for: NA, POTASSIUM, CHLORIDE, CO2, BUN, CR, GLC  COAGS: No results found for: PTT, INR, FIBR  POC: No results found for: BGM, HCG, HCGS  HEPATIC: No results found for: ALBUMIN, PROTTOTAL, ALT, AST, GGT, ALKPHOS, BILITOTAL, BILIDIRECT, NIKOLAS  OTHER: No results found for: PH, LACT, A1C, RONALD, PHOS, MAG, LIPASE, AMYLASE, TSH, T4, T3, CRP, SED    Anesthesia Plan    ASA Status:  2   NPO Status:  NPO Appropriate    Anesthesia Type: General.     - Airway: ETT   Induction: Intravenous.   Maintenance: Balanced.        Consents    Anesthesia Plan(s) and associated risks, benefits, and realistic alternatives discussed. Questions answered and patient/representative(s) expressed understanding.    - Discussed:     - Discussed with:  Patient         Postoperative Care    Pain management: Multi-modal analgesia.   PONV prophylaxis: Ondansetron (or other 5HT-3), Dexamethasone or Solumedrol     Comments:                River Roblero MD

## 2022-08-03 NOTE — CONSULTS
Cardiology Inpatient Consultation  August 3, 2022    Reason for Consult:  A cardiology consult was requested by Dr. Mckeon from the ENT service to provide clinical guidance regarding post op afib.    Assessment and Recommendation:  Chris Meadows is a 66 year old male with a PMhx of HLD, osteoarthritis, and Cricopharyngeal achalasia who is s/p myotomy and diverticulectomy. Cardiology is consulted for post op afib.    # NSR with freq PAC's, PVC's  Post procedure RN reported afib, HR 60-70s. EKG with NSR with occ PVC's. On my tele review, pt appears to be in NSR with occ PVC's and PAC's, no afib noted.     - Check Mg; keep Mg > 2, K > 4  - No further work up needed at this time  - No medication changes needed    Thank you for consulting the cardiovascular services at the [facility]. Please do not hesitate to call us with any questions. Cardiology will sign off at this time    Carolina PELAEZ, CNP  [facility] Cardiology Consult Team  [phone]    HPI:   Chris Meadows is a 66 year old male with a PMhx of HLD, osteoarthritis, and Cricopharyngeal achalasia who is s/p myotomy and diverticulectomy. Cardiology is consulted for post op afib.     While in PACU, on tele, pt noted to be in afib, HR 60-70's.  EKG revealing NSR with freq PVC's. On tele review, patient has remained in NSR with freq PAC's and PVC's, HR 60-70's.    Per patient, since having COVID in December he has noticed some changes on his FitBit; HR when doing upper body exercises stays in 70's, with ambulation/cardio, HR does increase appropriately. He also noticed that when sleeping his HR range 45-50 (normal when sleeping). We discussed these findings, I also explained that FitBit may not  on the extra beats.     At the time of interview, the patient denies chest pain, palpitations, dyspnea at rest or with exertion, orthopnea, PND, lightheadedness, or syncope.     Review of Systems:    Complete review of systems was performed and  negative except per HPI.    PMH:  Past Medical History:   Diagnosis Date     Allergic rhinitis, cause unspecified      Diverticulosis of colon (without mention of hemorrhage)      Hallux malleus      Intestinal disaccharidase deficiencies and disaccharide malabsorption      Osteoarthrosis, unspecified whether generalized or localized, lower leg      Other and unspecified hyperlipidemia      Unspecified sleep apnea      Active Problems:  Patient Active Problem List    Diagnosis Date Noted     Zenker's (hypopharyngeal) diverticulum 2022     Priority: Medium     Zenker's diverticulum 2022     Priority: Medium     Social History:  Social History     Tobacco Use     Smoking status: Former Smoker     Quit date: 1988     Years since quittin.0     Smokeless tobacco: Never Used   Substance Use Topics     Alcohol use: No     Drug use: No     Family History:  History reviewed. No pertinent family history.    Medications:        lactated ringers 125 mL/hr at 22 1345       Physical Exam:  Temp:  [97  F (36.1  C)-98.3  F (36.8  C)] 97  F (36.1  C)  Pulse:  [45-89] 65  Resp:  [10-27] 17  BP: (105-160)/(34-88) 115/78  SpO2:  [90 %-99 %] 97 %    Intake/Output Summary (Last 24 hours) at 8/3/2022 1551  Last data filed at 8/3/2022 1345  Gross per 24 hour   Intake 1000 ml   Output --   Net 1000 ml     GEN: pleasant, no acute distress  HEENT: no icterus  CV: RRR, normal s1/s2, no murmurs/rubs/s3/s4, no heave. No JVD   CHEST: clear to ausculation bilaterally, no rales or wheezing  ABD: soft, non-tender, normal active bowel sounds  EXTR: pulses intact. No clubbing, cyanosis or edema.   NEURO: alert oriented, speech fluent/appropriate, motor grossly nonfocal    Diagnostics:  All labs and imaging were reviewed, of note:    CMP  Recent Labs   Lab 22  1308 22  0909   *  --    POTASSIUM 5.0  --    CHLORIDE 104  --    CO2 22  --    ANIONGAP 9  --    * 113*   BUN 17.5  --    CR 0.95  --     GFRESTIMATED 89  --    RONALD 8.8  --    PROTTOTAL 6.5  --    ALBUMIN 4.2  --    BILITOTAL 0.6  --    ALKPHOS 65  --    AST 27  --    ALT 20  --      CBCNo lab results found in last 7 days.  INRNo lab results found in last 7 days.  Arterial Blood Gas  Recent Labs   Lab 08/03/22  1308   O2PER 21       No results found for: TROPI, TROPONIN, TROPR, TROPN      EKG 8/3/2022: NSR HR 70, freq PVC's      Time Spent on this Encounter   I spent 40 minutes on the unit/floor managing the care of Chris Meadows. Over 50% of my time was spent on the following:   - Counseling the patient and/or family regarding: diagnostic results  - Coordination of care with the: consultant(s)    Enid Baltazar, CNP

## 2022-08-03 NOTE — ANESTHESIA CARE TRANSFER NOTE
Patient: Chris Meadows    Procedure: Procedure(s):  MYOTOMY, CRICOPHARYNGEAL,  endoscopic with carbon dioxide laser  flexible esophagoscopy and balloon dilation       Diagnosis: Cricopharyngeal achalasia [K22.0]  Diagnosis Additional Information: No value filed.    Anesthesia Type:   General     Note:    Oropharynx: oropharynx clear of all foreign objects and spontaneously breathing  Level of Consciousness: drowsy  Oxygen Supplementation: nasal cannula  Level of Supplemental Oxygen (L/min / FiO2): 4  Independent Airway: airway patency satisfactory and stable  Dentition: dentition unchanged  Vital Signs Stable: post-procedure vital signs reviewed and stable  Report to RN Given: handoff report given  Patient transferred to: PACU    Handoff Report: Identifed the Patient, Identified the Reponsible Provider, Reviewed the pertinent medical history, Discussed the surgical course, Reviewed Intra-OP anesthesia mangement and issues during anesthesia, Set expectations for post-procedure period and Allowed opportunity for questions and acknowledgement of understanding      Vitals:  Vitals Value Taken Time   /88 08/03/22 1215   Temp     Pulse 64 08/03/22 1218   Resp 22 08/03/22 1218   SpO2 99 % 08/03/22 1218   Vitals shown include unvalidated device data.    Electronically Signed By: LATANYA Matthews CRNA  August 3, 2022  12:19 PM

## 2022-08-04 VITALS
TEMPERATURE: 98.2 F | RESPIRATION RATE: 18 BRPM | HEIGHT: 68 IN | SYSTOLIC BLOOD PRESSURE: 112 MMHG | HEART RATE: 87 BPM | BODY MASS INDEX: 27.23 KG/M2 | WEIGHT: 179.68 LBS | OXYGEN SATURATION: 99 % | DIASTOLIC BLOOD PRESSURE: 59 MMHG

## 2022-08-04 LAB
ANION GAP SERPL CALCULATED.3IONS-SCNC: 11 MMOL/L (ref 7–15)
BUN SERPL-MCNC: 17.4 MG/DL (ref 8–23)
CALCIUM SERPL-MCNC: 8.9 MG/DL (ref 8.8–10.2)
CHLORIDE SERPL-SCNC: 103 MMOL/L (ref 98–107)
CREAT SERPL-MCNC: 0.93 MG/DL (ref 0.67–1.17)
DEPRECATED HCO3 PLAS-SCNC: 22 MMOL/L (ref 22–29)
GFR SERPL CREATININE-BSD FRML MDRD: >90 ML/MIN/1.73M2
GLUCOSE SERPL-MCNC: 137 MG/DL (ref 70–99)
HOLD SPECIMEN: NORMAL
MAGNESIUM SERPL-MCNC: 2.2 MG/DL (ref 1.7–2.3)
POTASSIUM SERPL-SCNC: 4.4 MMOL/L (ref 3.4–5.3)
SODIUM SERPL-SCNC: 136 MMOL/L (ref 136–145)

## 2022-08-04 PROCEDURE — 80048 BASIC METABOLIC PNL TOTAL CA: CPT | Performed by: OTOLARYNGOLOGY

## 2022-08-04 PROCEDURE — G0378 HOSPITAL OBSERVATION PER HR: HCPCS

## 2022-08-04 PROCEDURE — 250N000013 HC RX MED GY IP 250 OP 250 PS 637: Performed by: OTOLARYNGOLOGY

## 2022-08-04 PROCEDURE — 258N000003 HC RX IP 258 OP 636: Performed by: OTOLARYNGOLOGY

## 2022-08-04 PROCEDURE — 36415 COLL VENOUS BLD VENIPUNCTURE: CPT | Performed by: OTOLARYNGOLOGY

## 2022-08-04 PROCEDURE — 83735 ASSAY OF MAGNESIUM: CPT | Performed by: OTOLARYNGOLOGY

## 2022-08-04 RX ORDER — NALOXONE HYDROCHLORIDE 0.4 MG/ML
0.2 INJECTION, SOLUTION INTRAMUSCULAR; INTRAVENOUS; SUBCUTANEOUS
Status: DISCONTINUED | OUTPATIENT
Start: 2022-08-04 | End: 2022-08-04 | Stop reason: HOSPADM

## 2022-08-04 RX ORDER — ACETAMINOPHEN 325 MG/1
325-650 TABLET ORAL EVERY 4 HOURS PRN
Qty: 100 TABLET | Refills: 0 | Status: SHIPPED | OUTPATIENT
Start: 2022-08-04

## 2022-08-04 RX ORDER — ACETAMINOPHEN 325 MG/1
650 TABLET ORAL EVERY 4 HOURS PRN
Status: DISCONTINUED | OUTPATIENT
Start: 2022-08-04 | End: 2022-08-04 | Stop reason: HOSPADM

## 2022-08-04 RX ORDER — NALOXONE HYDROCHLORIDE 0.4 MG/ML
0.4 INJECTION, SOLUTION INTRAMUSCULAR; INTRAVENOUS; SUBCUTANEOUS
Status: DISCONTINUED | OUTPATIENT
Start: 2022-08-04 | End: 2022-08-04 | Stop reason: HOSPADM

## 2022-08-04 RX ORDER — OXYCODONE HYDROCHLORIDE 5 MG/1
5 TABLET ORAL EVERY 4 HOURS PRN
Qty: 6 TABLET | Refills: 0 | Status: SHIPPED | OUTPATIENT
Start: 2022-08-04

## 2022-08-04 RX ORDER — OXYCODONE HYDROCHLORIDE 5 MG/1
5 TABLET ORAL EVERY 4 HOURS PRN
Status: DISCONTINUED | OUTPATIENT
Start: 2022-08-04 | End: 2022-08-04 | Stop reason: HOSPADM

## 2022-08-04 RX ADMIN — SODIUM CHLORIDE, POTASSIUM CHLORIDE, SODIUM LACTATE AND CALCIUM CHLORIDE: 600; 310; 30; 20 INJECTION, SOLUTION INTRAVENOUS at 00:13

## 2022-08-04 RX ADMIN — ACETAMINOPHEN 650 MG: 325 TABLET, FILM COATED ORAL at 11:58

## 2022-08-04 RX ADMIN — OXYCODONE HYDROCHLORIDE 5 MG: 5 TABLET ORAL at 13:48

## 2022-08-04 RX ADMIN — OXYCODONE HYDROCHLORIDE 5 MG: 5 TABLET ORAL at 09:47

## 2022-08-04 NOTE — PLAN OF CARE
PRIMARY DIAGNOSIS: new arrhythmia  OUTPATIENT/OBSERVATION GOALS TO BE MET BEFORE DISCHARGE:  1. Maintaining saturations >90% on room air: Met  2. NPO until POD1 and then will trial liquids first followed by soft diet: In progress, remains NPO at this time  3. No evidence of crepitus at the neck: Met  4. No fevers: Met     Discharge Planner Nurse   Safe discharge environment identified: Yes  Barriers to discharge: No       Entered by: Chastity José RN 08/04/2022 12:42 AM     Please review provider order for any additional goals.   Nurse to notify provider when observation goals have been met and patient is ready for discharge.    'What is Observation?' information document given to pt/family. All questions answered.

## 2022-08-04 NOTE — PROVIDER NOTIFICATION
2004  Xander Foster updated on heart rhythm of SR with PAC/PVC on arrival to floor. Also discussed pain plan - pt refusing tylenol suppository. Prn chloraseptic throat spray ordered.       2148  Xander Foster notified that pt reported he completed a covid swab at [facility] 8/1/22, viewable under Lab tab of Chart Review, but per lab we need a provider to okay an outside test for us to use, as he doesn't want us to swab again. Provider placed a patient care order okay to use outside covid test.

## 2022-08-04 NOTE — PROGRESS NOTES
Neuro: A&Ox4. Able to make needs known.  Cardiac: SR w PVC/PAC 70-90s. VSS.   Respiratory: Sating >95% on RA.  GI/: Adequate urine output. No BM this shift.   Diet/appetite: Strict NPO for 24hrs after procedure.   Activity: SBA, ambulated to bathroom.  Pain: throat/neck pain at acceptable level with cold packs and rest. Prn throat spray, rectal tylenol and IV dilaudid available.  Skin: No new deficits noted. No crepitus noted on neck.  LDA's:  -L PIV: LR    Plan: Discharge tomorrow. Continue with POC. Notify primary team with changes.

## 2022-08-04 NOTE — PLAN OF CARE
PRIMARY DIAGNOSIS: new arrhythmia   OUTPATIENT/OBSERVATION GOALS TO BE MET BEFORE DISCHARGE:  Maintaining saturations >90% on room air: met  NPO until POD1 and then will trial liquids first followed by soft diet: not met  No evidence of crepitus at the neck: met  No fevers: met       Discharge Planner Nurse   Safe discharge environment identified: yes  Barriers to discharge: yes -  meeting goals       Entered by: Amina Pollack RN 08/03/2022 10:35 PM

## 2022-08-04 NOTE — PLAN OF CARE
PRIMARY DIAGNOSIS: New arrhthymias  OUTPATIENT/OBSERVATION GOALS TO BE MET BEFORE DISCHARGE:  1. Maintaining saturations >90% on room air this shift. Met  2. Trial POD diet: Met  3. No evidence of crepitus at the neck. Met  4. No fevers. Met          Discharge Planner Nurse   Safe discharge environment identified: Yes  Barriers to discharge: No       Entered by: Julia Silva RN 08/04/2022 200 PM  Nurse to notify provider when observation goals have been met and patient is ready for discharge.

## 2022-08-04 NOTE — PLAN OF CARE
PRIMARY DIAGNOSIS: New arrhthymias  OUTPATIENT/OBSERVATION GOALS TO BE MET BEFORE DISCHARGE:  Maintaining saturations >90% on room air this shift. Met  Trial POD diet: Still not Met  No evidence of crepitus at the neck. Met  No fevers. Met          Discharge Planner Nurse   Safe discharge environment identified: Yes  Barriers to discharge: Yes       Entered by: Julia Silva RN 08/04/2022 1200 PM  Nurse to notify provider when observation goals have been met and patient is ready for discharge.

## 2022-08-04 NOTE — PLAN OF CARE
PRIMARY DIAGNOSIS: new arrhythmia  OUTPATIENT/OBSERVATION GOALS TO BE MET BEFORE DISCHARGE:  1. Maintaining saturations >90% on room air: Met  2. NPO until POD1 and then will trial liquids first followed by soft diet: In progress, remains NPO at this time  3. No evidence of crepitus at the neck: Met  4. No fevers: Met     Discharge Planner Nurse   Safe discharge environment identified: Yes  Barriers to discharge: No       Entered by: Chastity José RN 08/04/2022 4:53 AM     Please review provider order for any additional goals.   Nurse to notify provider when observation goals have been met and patient is ready for discharge.    A/O x4. Verbalizing frustration with overnight hospitalization- education and emotional support provided. VSS, SR with frequent PAC and PVCs. Up independently in room, voiding in bathroom. No BM this shift. C/o 4/10 throat/neck pain, pain meds and ice packs offered but pt declined. Strict NPO continues. No crepitus noted on neck. PIV x1 with MIVF. Cont with plan of care.

## 2022-08-04 NOTE — DISCHARGE SUMMARY
Discharge Summary  Chris Meadows  [id number]  1955    Date of Admission: 8/3/2022  Date of Discharge: 8/4/2022    Admission Diagnosis: Cricopharyngeal achalasia [K22.0]  Zenker's (hypopharyngeal) diverticulum [K22.5]  Zenker's diverticulum [K22.5]  Discharge Diagnosis: Same    Procedures:  Date:   Procedure(s):  MYOTOMY, CRICOPHARYNGEAL,  endoscopic with carbon dioxide laser  flexible esophagoscopy and balloon dilation    Pathology: none    HPI: Chris Meadows is a 66 year old male with history of HLD, OA, and recurrent Zenker's diverticulum. It was recommended that he undergo operative intervention and the patient consented to the above procedure after detailed explanation of the risks and benefits of said procedure.    Hospital Course: The patient was admitted to the hospital and underwent the above mentioned procedure. He tolerated the procedure without any intra- or olivier-operative complications. Please see the operative report for full details of the procedure. The patient was admitted for post-operative monitoring. His postoperative course was uneventful. At discharge, the patient's pain was well controlled, the patient was voiding on his own, and  was ambulating and tolerating a regular diet.     Discharge Exam:  Vitals:    08/03/22 2200 08/04/22 0007 08/04/22 0445 08/04/22 0708   BP: 117/66 98/67 134/76 110/66   BP Location:  Right arm Right arm Right arm   Pulse: 91 79 79 62   Resp:  18 16 16   Temp:  98.4  F (36.9  C) 98.2  F (36.8  C) 97.7  F (36.5  C)   TempSrc:  Oral Oral Oral   SpO2:  97% 96% 96%   Weight:   81.5 kg (179 lb 10.8 oz)    Height:           General: A&O x 3, No acute distress  HEENT: PERRL, EOMI without spontaneous or gaze evoked nystagmus. Incision is clean, dry, and intact; no bleeding no drainage. No crepitus.  Respiratory: Breathing non-labored on room air, no stridor, no accessory muscle use.     Discharge Medications:     Medication List      There are no discharge medications for  this visit.         No discharge procedures on file.    Dispo: To home in good condition. All of the patient's questions/concerns have been addressed at this time.     Nick Ariza MD  Otolaryngology-Head & Neck Surgery Resident  Please contact ENT by dialing [phone] and entering job code [phone].

## 2022-08-04 NOTE — PLAN OF CARE
PRIMARY DIAGNOSIS: New arrhthymias  OUTPATIENT/OBSERVATION GOALS TO BE MET BEFORE DISCHARGE:  Maintaining saturations >90% on room air this shift. Met  NPO until POD1 and then will trial liquids first followed by soft diet. Not Met  No evidence of crepitus at the neck. Met  No fevers. Met     Discharge Planner Nurse   Safe discharge environment identified: Yes  Barriers to discharge: Yes       Entered by: Julia Silva RN 08/04/2022 3:15 PM     Nurse to notify provider when observation goals have been met and patient is ready for discharge.

## 2022-08-04 NOTE — PROGRESS NOTES
"Otolaryngology Progress Note  August 4, 2022    S: No acute events overnight. Declined rectal tylenol; received a few doses of dilaudid yesterday afternoon, none since 530p. Bedside swallow assessment with Dr. Cho this morning, cleared for liquids and pills.    O: /76 (BP Location: Right arm)   Pulse 79   Temp 98.2  F (36.8  C) (Oral)   Resp 16   Ht 1.727 m (5' 8\")   Wt 81.5 kg (179 lb 10.8 oz)   SpO2 96%   BMI 27.32 kg/m     General: Alert and oriented x 3, No acute distress   HEENT: EOMI. HB 1/6. No crepitus.   Pulmonary: Breathing non-labored, no stridor, no accessory muscle use.    Intake/Output Summary (Last 24 hours) at 8/4/2022 0701  Last data filed at 8/4/2022 0400  Gross per 24 hour   Intake 2781.25 ml   Output --   Net 2781.25 ml     LABS:  ROUTINE IP LABS (Last four results)  BMP  Recent Labs   Lab 08/03/22  1308 08/03/22  0909   *  --    POTASSIUM 5.0  --    CHLORIDE 104  --    RONALD 8.8  --    CO2 22  --    BUN 17.5  --    CR 0.95  --    * 113*     CBCNo lab results found in last 7 days.  INRNo lab results found in last 7 days.    A/P: Chris Meadows is a 66 year old male with a past medical history of HLD, OA, recurrent Zenker's diverticulum now s/p endoscopic CO2 laser myotomy 8/3. Recovering well.  Neuro:  - Pain control: discontinue dilaudid; started on tylenol with PRN oxy  HEENT:  - Monitor for crepitus as diet advances  Respiratory:  - no issues  - supplemental O2 PRN to keep sats >92%  CV/heme:  - hemodynamically stable  - follow up cards consult with PCP  FEN/GI:  - Advancing diet from liquids to soft for lunch  :  - voiding independently  Endo  - none  ID:  - none  PPX:  - SCDs  Dispo: To home    -- Patient and above plan discussed with Dr. Maqruis Ariza MD  Otolaryngology-Head & Neck Surgery, PGY-1  Please contact ENT with questions by dialing [phone] and entering job code [phone] when prompted.    "

## 2022-08-04 NOTE — PROGRESS NOTES
Transfer  Transferred from: PACU  Via: bed  Reason for transfer: Pt appropriate for 6B- observation for tele change  Family: pt notified wife  Belongings: Received with pt  Chart: Received with pt  Medications: Meds received from old unit with pt  Code Status verified on armband: yes  2 RN Skin Assessment Completed By: Amina KIMBLE & Chastity VILLAR  Med rec completed: yes  Bed surface reassessed with algorithm and charted: yes  New bed surface ordered: no  Suction/Ambu bag/Flowmeter at bedside: yes    Report received from: KAMAR Virgen  Pt status: A&Ox4, VSS on RA.

## 2022-08-04 NOTE — PROGRESS NOTES
Neuro: A&Ox4. Pt is able to communicate their needs.   Cardiac: SR with PVCs/ PACs. Team notified.   No C/O of any chest pain.   Respiratory: Sating 96% on RA. No cough present. No C/O of any SOB.  GI/: Adequate urine output.No BM this shift. Easy chew diet.   Diet/appetite: Tolerating easy chew diet. Eating well.   Activity:  Independent, up to chair and able to ambulate throughout room.  Pain: 2/10. PRN given this shift.   Skin: Skin remains clean, dry and intact.     DISCHARGE as of 1449pm.   Discharged to: Home  Via: Automobile  Accompanied by: Family (wife)  Discharge Instructions: principal diagnosis, major findings/procedures, diet, activity, medications, follow up appointments, when to call the MD, and what to watchout for (i.e. s/s of infection, increasing SOB, palpitations, Angina). Pt states understanding of all discharge instructions.   Prescriptions: To be filled by  pharmacy per pt's request.  Follow Up Appointments:   Belongings: All sent with pt. Pt verifies that they are taking all belongings with them.   IV: discontinued.   Telemetry: discontinued.   Pt exhibits understanding of above discharge instructions; all questions answered.  Discharge Paperwork: faxed to discharge planner.

## 2022-08-04 NOTE — CONSULTS
Care Management Initial Consult    General Information  Assessment completed with:  Chris Curry  Type of CM/SW Visit: Offer D/C Planning; ENRIQUEZ notice    Primary Care Provider verified and updated as needed:  Yes.  Dr Farhad Owen, [facility]   Readmission within the last 30 days: no previous admission in last 30 days   Reason for Consult: discharge planning, MOON form  Advance Care Planning:   Not addressed       Communication Assessment  Patient's communication style: spoken language (English or Bilingual)    Hearing Difficulty or Deaf: yes      Cognitive  Cognitive/Neuro/Behavioral: WDL  Level of Consciousness: alert  Arousal Level: opens eyes spontaneously  Orientation: oriented x 4  Mood/Behavior: cooperative  Best Language: 0 - No aphasia  Speech: clear, spontaneous, logical    Living Environment:   People in home: spouse, Leidy  Current living Arrangements:        Able to return to prior arrangements: yes     Family/Social Support:  Care provided by: self  Provides care for:    Marital Status:            Description of Support System:         Current Resources:   Patient receiving home care services: No     Community Resources:    Equipment currently used at home:    Supplies currently used at home:      Employment/Financial:  Employment Status: retired        Financial Concerns:        Lifestyle & Psychosocial Needs:  Social Determinants of Health     Tobacco Use: Medium Risk     Smoking Tobacco Use: Former Smoker     Smokeless Tobacco Use: Never Used   Alcohol Use: Not on file   Financial Resource Strain: Not on file   Food Insecurity: Not on file   Transportation Needs: Not on file   Physical Activity: Not on file   Stress: Not on file   Social Connections: Not on file   Intimate Partner Violence: Not on file   Depression: Not at risk     PHQ-2 Score: 0   Housing Stability: Not on file       Functional Status:  Prior to admission patient needed assistance:  Pt was independent  "prior to admission, able to do his own ADLs. He was not taking any medication prior to admission.   Dependent ADLs: Independent      Mental Health Status:        Chemical Dependency Status:        Values/Beliefs:  Spiritual, Cultural Beliefs, Yazdanism Practices, Values that affect care:               Diagnosis:  Dysphagia; Zenker's diverticulum  Procedure:   1. Zenker's diverticulectomy, endoscopic, using the CO2 laser   2. Transoral flexible esophagoscopy with dilation using a CRE balloon to 16.5 mmHg.     Additional Information:  In 6B Discharge Rounds it was reported post-op it was thought pt had a-fib post-op, he was admitted under Observation Status for monitoring. Anticipate discharge today.   Cardiology consulted, \"pt appears to be in NSR with occ PVC's and PAC's, no afib noted.\" Pt with hx of Covid in December, 2021.     Pt on Observation Status. Introduced myself to pt and explained I help with discharge planning. Pt alert, resting in bed. Pt said the doctors told him he could discharge after lunch. They want him to try a soft diet first. He is wondering what foods he can eat (notified nursing & they will follow-up with pt). Pt reports his wife will be arriving in about half an hour & will provide transport home. Pt said he was independent prior to admission, did his own ADLs. He was not on any meds at home.   Pt signed the MOON form, given a copy and original placed in chart.   Pt's primary is Dr Farhad Owen at the [facility] in [place].         Brandy Casas, RN Care Coordinator (6B RNCC coverage)  Campbell DOOLEY, [facility]  On 8-04 contact at pager: [phone] or phone: [phone]      "

## 2022-08-08 LAB
ATRIAL RATE - MUSE: 70 BPM
ATRIAL RATE - MUSE: 71 BPM
DIASTOLIC BLOOD PRESSURE - MUSE: NORMAL MMHG
DIASTOLIC BLOOD PRESSURE - MUSE: NORMAL MMHG
INTERPRETATION ECG - MUSE: NORMAL
INTERPRETATION ECG - MUSE: NORMAL
P AXIS - MUSE: -8 DEGREES
P AXIS - MUSE: NORMAL DEGREES
PR INTERVAL - MUSE: 178 MS
PR INTERVAL - MUSE: 184 MS
QRS DURATION - MUSE: 84 MS
QRS DURATION - MUSE: 84 MS
QT - MUSE: 370 MS
QT - MUSE: 408 MS
QTC - MUSE: 440 MS
QTC - MUSE: 442 MS
R AXIS - MUSE: -47 DEGREES
R AXIS - MUSE: -49 DEGREES
SYSTOLIC BLOOD PRESSURE - MUSE: NORMAL MMHG
SYSTOLIC BLOOD PRESSURE - MUSE: NORMAL MMHG
T AXIS - MUSE: 33 DEGREES
T AXIS - MUSE: 4 DEGREES
VENTRICULAR RATE- MUSE: 70 BPM
VENTRICULAR RATE- MUSE: 86 BPM

## 2022-09-04 ENCOUNTER — HEALTH MAINTENANCE LETTER (OUTPATIENT)
Age: 67
End: 2022-09-04

## 2022-09-09 ENCOUNTER — THERAPY VISIT (OUTPATIENT)
Dept: SPEECH THERAPY | Facility: CLINIC | Age: 67
End: 2022-09-09

## 2022-09-09 ENCOUNTER — OFFICE VISIT (OUTPATIENT)
Dept: OTOLARYNGOLOGY | Facility: CLINIC | Age: 67
End: 2022-09-09
Payer: MEDICARE

## 2022-09-09 VITALS
HEIGHT: 68 IN | BODY MASS INDEX: 27.13 KG/M2 | DIASTOLIC BLOOD PRESSURE: 77 MMHG | HEART RATE: 55 BPM | WEIGHT: 179 LBS | SYSTOLIC BLOOD PRESSURE: 137 MMHG

## 2022-09-09 DIAGNOSIS — R13.12 OROPHARYNGEAL DYSPHAGIA: Primary | ICD-10-CM

## 2022-09-09 DIAGNOSIS — R13.12 DYSPHAGIA, OROPHARYNGEAL PHASE: Primary | ICD-10-CM

## 2022-09-09 PROCEDURE — 92613 ENDOSCOPY SWALLOW (FEES) I&R: CPT | Performed by: OTOLARYNGOLOGY

## 2022-09-09 PROCEDURE — 92612 ENDOSCOPY SWALLOW (FEES) VID: CPT | Mod: GN | Performed by: OTOLARYNGOLOGY

## 2022-09-09 PROCEDURE — 99024 POSTOP FOLLOW-UP VISIT: CPT | Mod: 25 | Performed by: OTOLARYNGOLOGY

## 2022-09-09 PROCEDURE — 92610 EVALUATE SWALLOWING FUNCTION: CPT | Mod: GN | Performed by: SPEECH-LANGUAGE PATHOLOGIST

## 2022-09-09 ASSESSMENT — PAIN SCALES - GENERAL: PAINLEVEL: MODERATE PAIN (4)

## 2022-09-09 NOTE — PROGRESS NOTES
[facility]   at the [facility]   Otolaryngology Clinic     Patient: Chris Meadows    MRN: [id number]    : 1955    Age/Gender: 66 year old male  Date of Service: 2022  Rendering Provider:   Azra Cho MD     Chief Complaint   Dysphagia  Zenker's diverticulum s/p diverticulectomy, CO2 laser, CRE dilation to 16.5 mmHg 8/3/22  Interval History   HISTORY OF PRESENT ILLNESS: Mr. Meadows is a 66 year old male is being followed for dysphagia and post op follow-up. he was initially seen on 22. Please refer to this note for full history.     Today, he presents for follow up. he reports:  - he has done well post operatively   - he did eat triscuits and noticed scratchy throat  - overall slightly dry in throat  - no pain or regurgitation  - no sensation of food sticking  - had bouts of racing heart recently   - he was not directly found to have atrial fibrillation   - however he then had increased heart rate while exercising  - continues to have anxiety over this  - he is under workup for atrial fibrillation and currently has on a Holter monitor  - he is going on a cruise next week in [place] and [place] and will not be able to complete the full trial of his Holter     PAST MEDICAL HISTORY:   Past Medical History:   Diagnosis Date     Allergic rhinitis, cause unspecified      Diverticulosis of colon (without mention of hemorrhage)      Hallux malleus      Intestinal disaccharidase deficiencies and disaccharide malabsorption      Osteoarthrosis, unspecified whether generalized or localized, lower leg      Other and unspecified hyperlipidemia      Unspecified sleep apnea        PAST SURGICAL HISTORY:   Past Surgical History:   Procedure Laterality Date     COMBINED REPAIR PTOSIS WITH BLEPHAROPLASTY BILATERAL  2013    Procedure: COMBINED REPAIR PTOSIS WITH BLEPHAROPLASTY BILATERAL;  BILATERAL UPPER LID BLEPHAROPLASTY AND PTOSIS REPAIR  ;  Surgeon: River Lazo MD;   Location:  [facility]     ESOPHAGOSCOPY FLEXIBLE N/A 8/3/2022    Procedure: flexible esophagoscopy and balloon dilation;  Surgeon: Azra Cho MD;  Location: [facility] OR     LASER CO2 LARYNGOSCOPY N/A 8/3/2022    Procedure: endoscopic with carbon dioxide laser;  Surgeon: Azra Cho MD;  Location: [facility] OR     MYOTOMY CRICOPHARYNGEAL N/A 8/3/2022    Procedure: MYOTOMY, CRICOPHARYNGEAL,;  Surgeon: Azra Cho MD;  Location: [facility] OR       CURRENT MEDICATIONS:   Current Outpatient Medications:      acetaminophen (TYLENOL) 325 MG tablet, Take 1-2 tablets (325-650 mg) by mouth every 4 hours as needed for mild pain or fever, Disp: 100 tablet, Rfl: 0     Ascorbic Acid (VITAMIN C PO), Take 500 mg by mouth., Disp: , Rfl:      chromium 200 MCG CAPS, Take 200 mcg by mouth daily., Disp: , Rfl:      Cinnamon Bark POWD, , Disp: , Rfl:      clindamycin (CLEOCIN) 150 MG capsule, Take 1 capsule (150 mg) by mouth 3 times daily, Disp: 24 capsule, Rfl: 0     co-enzyme Q-10 100 MG CAPS, Take  by mouth daily., Disp: , Rfl:      dhea 25 MG TABS, Take 25 mg by mouth daily., Disp: , Rfl:      glucosamine-chondroitin 500-400 MG CAPS, Take 1 capsule by mouth daily., Disp: , Rfl:      multivitamin, therapeutic with minerals (MULTI-VITAMIN) TABS, Take 1 tablet by mouth daily., Disp: , Rfl:      Omega-3 Fatty Acids (OMEGA-3 FISH OIL PO), Take 500 mg by mouth., Disp: , Rfl:      oxyCODONE (ROXICODONE) 5 MG tablet, Take 1 tablet (5 mg) by mouth every 4 hours as needed for moderate to severe pain, Disp: 6 tablet, Rfl: 0     phenol (CHLORASEPTIC) 1.4 % spray, Take 1 spray (1 mL) by mouth every hour as needed for sore throat, Disp: 20 mL, Rfl: 0     red yeast rice 600 MG CAPS, Take 600 mg by mouth daily., Disp: , Rfl:      SAW BJ, , Disp: , Rfl:      vitamin D (ERGOCALCIFEROL) 06727 UNIT capsule, Take 400 Units by mouth daily., Disp: , Rfl:      VITAMIN E NATURAL PO, Take  by mouth., Disp: , Rfl:   No current facility-administered medications for this  visit.    Facility-Administered Medications Ordered in Other Visits:      barium sulfate 40% (VARIBAR THIN HONEY) oral suspension 25 mL, 25 mL, Oral, Once, Haja Sow MD    ALLERGIES: Hydrocodone-acetaminophen    SOCIAL HISTORY:    Social History     Socioeconomic History     Marital status:      Spouse name: Not on file     Number of children: Not on file     Years of education: Not on file     Highest education level: Not on file   Occupational History     Not on file   Tobacco Use     Smoking status: Former Smoker     Quit date: 1988     Years since quittin.1     Smokeless tobacco: Never Used   Substance and Sexual Activity     Alcohol use: No     Drug use: No     Sexual activity: Never   Other Topics Concern     Parent/sibling w/ CABG, MI or angioplasty before 65F 55M? Not Asked   Social History Narrative     Not on file     Social Determinants of Health     Financial Resource Strain: Not on file   Food Insecurity: Not on file   Transportation Needs: Not on file   Physical Activity: Not on file   Stress: Not on file   Social Connections: Not on file   Intimate Partner Violence: Not on file   Housing Stability: Not on file         FAMILY HISTORY: No family history on file.   Non-contributory for problems with anesthesia    REVIEW OF SYSTEMS:   The patient was asked a 14 point review of systems regarding constitutional symptoms, eye symptoms, ears, nose, mouth, throat symptoms, cardiovascular symptoms, respiratory symptoms, gastrointestinal symptoms, genitourinary symptoms, musculoskeletal symptoms, integumentary symptoms, neurological symptoms, psychiatric symptoms, endocrine symptoms, hematologic/lymphatic symptoms, and allergic/ immunologic symptoms.   The pertinent factors have been included in the HPI and below.  Patient Supplied Answers to Review of Systems  [facility]NT ROS 2022   Psychology: Frequently feeling anxious   Ears, Nose, Throat: Hearing loss, Nasal congestion or  drainage   Musculoskeletal: Back pain, Neck pain       Physical Examination   The patient underwent a physical examination as described below. The pertinent positive and negative findings are summarized after the description of the examination.  Constitutional: The patient's developmental and nutritional status was assessed. The patient's voice quality was assessed.  Head and Face: The head and face were inspected for deformities. The sinuses were palpated. The salivary glands were palpated. Facial muscle strength was assessed bilaterally.  Eyes: Extraocular movements and primary gaze alignment were assessed.  Ears, Nose, Mouth and Throat: The ears and nose were examined for deformities. The nasal septum, mucosa, and turbinates were inspected by anterior rhinoscopy. The lips, teeth, and gums were examined for abnormalities. The oral mucosa, tongue, palate, tonsils, lateral and posterior pharynx were inspected for the presence of asymmetry or mucosal lesions.    Neck: The tracheal position was noted, and the neck mass palpated to determine if there were any asymmetries, abnormal neck masses, thyromegally, or thyroid nodules.  Respiratory: The nature of the breathing and chest expansion/symmetry was observed.  Cardiovascular: The patient was examined to determine the presence of any edema or jugular venous distension.  Abdomen: The contour of the abdomen was noted.  Lymphatic: The patient was examined for infraclavicular lymphadenopathy.  Musculoskeletal: The patient was inspected for the presence of skeletal deformities.  Extremities: The extremities were examined for any clubbing or cyanosis.  Skin: The skin was examined for inflammatory or neoplastic conditions.  Neurologic: The patient's orientation, mood, and affect were noted. The cranial nerve  functions were examined.  Other pertinent positive and negative findings on physical examination:   OC/OP: no lesions, uvula midline, soft palate elevates symmetrically    Neck: no lesions, no TH tenderness to palpation  All other physical examination findings were within normal limits and noncontributory.    Procedures   Flexible laryngoscopy (CPT 96361)      Pre-procedure diagnosis: dysphonia  Post-procedure diagnosis: same as above  Indication for procedure: Mr. Meadows is a 66 year old male with see above  Procedure(s): Fiberoptic Laryngoscopy    Details of Procedure: After informed consent was obtained, the patient was seated in the examination chair.  The areas of the nasopharynx as well as the hypopharynx were anesthetized with topical 4% lidocaine with 0.25% phenylephrine atomizer.  Examination of the base of tongue was performed first.  Attention was directed to any evidence of masses in the area or evidence of leukoplakia or candidal infection.  Attention was directed to the epiglottis where its size and position was determined and its movement on phonation of the vowel  e .  The piriform sinuses were then inspected for any mass lesions or pooling of secretions.  Attention was then directed to the larynx. The vocal folds were inspected for infection or any areas of leukoplakia, for masses, polypoid degeneration, or hemorrhage.  Having done this, the arytenoids and vocal processes were inspected for erythema or evidence of granuloma formation.  The posterior commissure was then inspected for evidence of inflammatory changes in the mucosa and heaping up of mucosal tissue. The patient was then instructed to say the vowel  e .  Adduction of vocal folds to the midline was observed for any evidence of paresis or paralysis of the larynx or asymmetry in rotation of the larynx to the left or right. The patient was asked to breathe and the degree of abduction was noted bilaterally.  Subglottic view of the larynx was obtained for any additional mass lesions or mucosal changes.  Finally the post cricoid was examined for evidence of pooling of secretions, as well as the pharyngeal wall  mucosa.   Anesthesia type: 0.25% phenylephrine    Findings:  Anatomic/physiological deviations: RNC, presbylarynx and resolved mucus regurgitation in the left pyriform sinus   Right vocal process: No restriction of mobility   Left vocal process: No restriction of mobility  Glottal gap: Complete glottal closure  Supraglottic structures: Normal  Hypopharynx: Normal     Estimated Blood Loss: minimal  Complications: None  Disposition: Patient tolerated the procedure well           Fiberoptic Endoscopic Evaluation of Swallowing (CPT 54434)  and Interpretation of Swallowing (CPT 21018)    Indications: See above notes for complete history and physical. Patient complains of dysphagia to solids and/or there is suggestion on history and endoscopic exam of the presence of dysphagia causing medical complaints.  Swallowing evaluation is being performed to assess the presence and degree of dysphagia, and to recommend a safe diet.     Pulmonary Status:  No PNA   Current Diet:              regular                                        thin liquids      Consistency Amounts:  Thin Liquid: sip   Puree: 1 tsp  Solid: cookies            Positioning: upright in a chair  Oral Peripheral Exam: See physical exam section.  Anatomic Notes: See Videostroboscopy report for assessment of anatomy and laryngeal functioning  Pharyngeal secretions prior to administration of liquid or food: Yes  Oral Phase Abnormal Findings: No abnormal behavior observed  Behavioral Adaptations: No abnormal behavior observed  Pharyngeal Phase Abnormal Findings: no penetration, no aspiration and no residue, no EPR     Recommended Diet:  regular                                        thin liquids              Review of Relevant Clinical Data   I personally reviewed:  Labs:  No results found for: TSH  Lab Results   Component Value Date     08/04/2022    CO2 22 08/04/2022    BUN 17.4 08/04/2022     No results found for: WBC, HGB, HCT, MCV, PLT  No results found  "for: PT, PTT, INR  No results found for: MICHAEL  No components found for: RHEUMATOIDFACTOR,  RF  No results found for: CRP  No components found for: CKTOT, URICACID  No components found for: C3, C4, DSDNAAB, NDNAABIFA  No results found for: MPOAB    Patient reported Quality of Life (QOL) Measures   Patient Supplied Answers To VHI Questionnaire  Voice Handicap Index (VHI-10) 6/23/2022   My voice makes it difficult for people to hear me 2   People have difficulty understanding me in a noisy room 2   My voice difficulties restrict my personal and social life.  2   I feel left out of conversations because of my voice 1   My voice problem causes me to lose income 0   I feel as though I have to strain to produce voice 1   The clarity of my voice is unpredictable 2   My voice problem upsets me 2   My voice makes me feel handicapped 2   People ask, \"What's wrong with your voice?\" 1   VHI-10 15         Patient Supplied Answers To EAT Questionnaire  Eating Assessment Tool (EAT-10) 6/23/2022   My swallowing problem has caused me to lose weight 0   My swallowing problem interferes with my ability to go out for meals 0   Swallowing liquids takes extra effort 0   Swallowing solids takes extra effort 1   Swallowing pills takes extra effort 1   Swallowing is painful 1   The pleasure of eating is affected by my swallowing 1   When I swallow food sticks in my throat 0   I cough when I eat 0   Swallowing is stressful 1   EAT-10 5         Patient Supplied Answers To CSI Questionnaire  Cough Severity Index (CSI) 6/23/2022   My cough is worse when I lie down 0   My coughing problem causes me to restrict my personal and social life 0   I tend to avoid places because of my cough problem 0   I feel embarrassed because of my coughing problem 0   People ask, ''What's wrong?'' because I cough a lot 0   I run out of air when I cough 0   My coughing problem affects my voice 1   My coughing problem limits my physical activity 0   My coughing problem " upsets me 0   People ask me if I am sick because I cough a lot 0   CSI Score 1       Impression & Plan     IMPRESSION: Mr. Meadows is a 66 year old male who is being seen for the followin. Dysphagia  - in the setting of endoscopic zenker's diverticulectomy in 2018  - now with sore throat since 2021 after COVID  - overall throat is better however in the work up he was found to have his zenker's back  - is symptomatic but not as much as before  - Xray Video Swallow Exam today reviewed at rounds shows a zenker's diverticulum without EPR, this is similar to the FEES however on the scope he did have saliva regurgitate in the left pyriform and also very slight puree EPR with the first bite  - discussed that his sore throat symptoms are likely more due to his muscle tension dypshonia rather than the pouch  - however he does have slight symptoms from the zenker's so would recommend surgery  - he would like to get this done since he has been waiting for this for a few months  - discussed given he has favorable anatomy an endoscopic CO2 laser myotomy is indicated  - risks, benefits and alternatives were discussed   - underwent diverticulectomy on 8/3/22 with CO2 laser and dilation to 16.5mmHg  - he had post-op afib- undergoing work up for this  - symptoms 2022 include some dryness in the throat, however overall doing well since surgery  - FEES shows safe swallow and resolved EPR  - we cleared his swallow today and that he should avoid any foods that he finds trouble him  - discussed that he can follow with me as needed going forward unless he has new swallow issues  - offered health psychology evaluation - but he declined  Plan  - observation  - we will have Michael from our financial team reach out to him after he is back from his cruise the      2. 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 All documentation has been reviewed by the aforementioned provider prior to being entered into the official medical record.     Azra Cho MD    Laryngology    [facility]  Department of  Otolaryngology - Head and Neck Surgery  Clinics & Surgery Center  [address] [place] [address]  Appointment line: [phone]  Fax: [phone]  [email or web address]

## 2022-09-09 NOTE — LETTER
2022       RE: Chris Meadows  [address] [facility], [place] [address]     Dear Colleague,    Thank you for referring your patient, Chris Meadows, to the [facility] E[facility]. Please see a copy of my visit note below.        [facility]   at the [facility]   Otolaryngology Clinic     Patient: Chris Meadows    MRN: [id number]    : 1955    Age/Gender: 66 year old male  Date of Service: 2022  Rendering Provider:   Azra Cho MD     Chief Complaint   Dysphagia  Zenker's diverticulum s/p diverticulectomy, CO2 laser, CRE dilation to 16.5 mmHg 8/3/22  Interval History   HISTORY OF PRESENT ILLNESS: Mr. Meadows is a 66 year old male is being followed for dysphagia and post op follow-up. he was initially seen on 22. Please refer to this note for full history.     Today, he presents for follow up. he reports:  - he has done well post operatively   - he did eat triscuits and noticed scratchy throat  - overall slightly dry in throat  - no pain or regurgitation  - no sensation of food sticking  - had bouts of racing heart recently   - he was not directly found to have atrial fibrillation   - however he then had increased heart rate while exercising  - continues to have anxiety over this  - he is under workup for atrial fibrillation and currently has on a Holter monitor  - he is going on a cruise next week in [place] and [place] and will not be able to complete the full trial of his Holter     PAST MEDICAL HISTORY:   Past Medical History:   Diagnosis Date     Allergic rhinitis, cause unspecified      Diverticulosis of colon (without mention of hemorrhage)      Hallux malleus      Intestinal disaccharidase deficiencies and disaccharide malabsorption      Osteoarthrosis, unspecified whether generalized or localized, lower leg      Other and unspecified hyperlipidemia      Unspecified sleep  apnea        PAST SURGICAL HISTORY:   Past Surgical History:   Procedure Laterality Date     COMBINED REPAIR PTOSIS WITH BLEPHAROPLASTY BILATERAL  7/25/2013    Procedure: COMBINED REPAIR PTOSIS WITH BLEPHAROPLASTY BILATERAL;  BILATERAL UPPER LID BLEPHAROPLASTY AND PTOSIS REPAIR  ;  Surgeon: River Lazo MD;  Location:  [facility]     ESOPHAGOSCOPY FLEXIBLE N/A 8/3/2022    Procedure: flexible esophagoscopy and balloon dilation;  Surgeon: Azra Cho MD;  Location: [facility] OR     LASER CO2 LARYNGOSCOPY N/A 8/3/2022    Procedure: endoscopic with carbon dioxide laser;  Surgeon: Azra Cho MD;  Location: [facility] OR     MYOTOMY CRICOPHARYNGEAL N/A 8/3/2022    Procedure: MYOTOMY, CRICOPHARYNGEAL,;  Surgeon: Azra Cho MD;  Location: [facility] OR       CURRENT MEDICATIONS:   Current Outpatient Medications:      acetaminophen (TYLENOL) 325 MG tablet, Take 1-2 tablets (325-650 mg) by mouth every 4 hours as needed for mild pain or fever, Disp: 100 tablet, Rfl: 0     Ascorbic Acid (VITAMIN C PO), Take 500 mg by mouth., Disp: , Rfl:      chromium 200 MCG CAPS, Take 200 mcg by mouth daily., Disp: , Rfl:      Cinnamon Bark POWD, , Disp: , Rfl:      clindamycin (CLEOCIN) 150 MG capsule, Take 1 capsule (150 mg) by mouth 3 times daily, Disp: 24 capsule, Rfl: 0     co-enzyme Q-10 100 MG CAPS, Take  by mouth daily., Disp: , Rfl:      dhea 25 MG TABS, Take 25 mg by mouth daily., Disp: , Rfl:      glucosamine-chondroitin 500-400 MG CAPS, Take 1 capsule by mouth daily., Disp: , Rfl:      multivitamin, therapeutic with minerals (MULTI-VITAMIN) TABS, Take 1 tablet by mouth daily., Disp: , Rfl:      Omega-3 Fatty Acids (OMEGA-3 FISH OIL PO), Take 500 mg by mouth., Disp: , Rfl:      oxyCODONE (ROXICODONE) 5 MG tablet, Take 1 tablet (5 mg) by mouth every 4 hours as needed for moderate to severe pain, Disp: 6 tablet, Rfl: 0     phenol (CHLORASEPTIC) 1.4 % spray, Take 1 spray (1 mL) by mouth every hour as needed for sore throat, Disp: 20 mL, Rfl: 0      red yeast rice 600 MG CAPS, Take 600 mg by mouth daily., Disp: , Rfl:      JANELLE LORENZO, , Disp: , Rfl:      vitamin D (ERGOCALCIFEROL) 34423 UNIT capsule, Take 400 Units by mouth daily., Disp: , Rfl:      VITAMIN E NATURAL PO, Take  by mouth., Disp: , Rfl:   No current facility-administered medications for this visit.    Facility-Administered Medications Ordered in Other Visits:      barium sulfate 40% (VARIBAR THIN HONEY) oral suspension 25 mL, 25 mL, Oral, Once, Haja Sow MD    ALLERGIES: Hydrocodone-acetaminophen    SOCIAL HISTORY:    Social History     Socioeconomic History     Marital status:      Spouse name: Not on file     Number of children: Not on file     Years of education: Not on file     Highest education level: Not on file   Occupational History     Not on file   Tobacco Use     Smoking status: Former Smoker     Quit date: 1988     Years since quittin.1     Smokeless tobacco: Never Used   Substance and Sexual Activity     Alcohol use: No     Drug use: No     Sexual activity: Never   Other Topics Concern     Parent/sibling w/ CABG, MI or angioplasty before 65F 55M? Not Asked   Social History Narrative     Not on file     Social Determinants of Health     Financial Resource Strain: Not on file   Food Insecurity: Not on file   Transportation Needs: Not on file   Physical Activity: Not on file   Stress: Not on file   Social Connections: Not on file   Intimate Partner Violence: Not on file   Housing Stability: Not on file         FAMILY HISTORY: No family history on file.   Non-contributory for problems with anesthesia    REVIEW OF SYSTEMS:   The patient was asked a 14 point review of systems regarding constitutional symptoms, eye symptoms, ears, nose, mouth, throat symptoms, cardiovascular symptoms, respiratory symptoms, gastrointestinal symptoms, genitourinary symptoms, musculoskeletal symptoms, integumentary symptoms, neurological symptoms, psychiatric symptoms, endocrine  symptoms, hematologic/lymphatic symptoms, and allergic/ immunologic symptoms.   The pertinent factors have been included in the HPI and below.  Patient Supplied Answers to Review of Systems   ENT ROS 9/2/2022   Psychology: Frequently feeling anxious   Ears, Nose, Throat: Hearing loss, Nasal congestion or drainage   Musculoskeletal: Back pain, Neck pain       Physical Examination   The patient underwent a physical examination as described below. The pertinent positive and negative findings are summarized after the description of the examination.  Constitutional: The patient's developmental and nutritional status was assessed. The patient's voice quality was assessed.  Head and Face: The head and face were inspected for deformities. The sinuses were palpated. The salivary glands were palpated. Facial muscle strength was assessed bilaterally.  Eyes: Extraocular movements and primary gaze alignment were assessed.  Ears, Nose, Mouth and Throat: The ears and nose were examined for deformities. The nasal septum, mucosa, and turbinates were inspected by anterior rhinoscopy. The lips, teeth, and gums were examined for abnormalities. The oral mucosa, tongue, palate, tonsils, lateral and posterior pharynx were inspected for the presence of asymmetry or mucosal lesions.    Neck: The tracheal position was noted, and the neck mass palpated to determine if there were any asymmetries, abnormal neck masses, thyromegally, or thyroid nodules.  Respiratory: The nature of the breathing and chest expansion/symmetry was observed.  Cardiovascular: The patient was examined to determine the presence of any edema or jugular venous distension.  Abdomen: The contour of the abdomen was noted.  Lymphatic: The patient was examined for infraclavicular lymphadenopathy.  Musculoskeletal: The patient was inspected for the presence of skeletal deformities.  Extremities: The extremities were examined for any clubbing or cyanosis.  Skin: The skin was  examined for inflammatory or neoplastic conditions.  Neurologic: The patient's orientation, mood, and affect were noted. The cranial nerve  functions were examined.  Other pertinent positive and negative findings on physical examination:   OC/OP: no lesions, uvula midline, soft palate elevates symmetrically   Neck: no lesions, no TH tenderness to palpation  All other physical examination findings were within normal limits and noncontributory.    Procedures   Flexible laryngoscopy (CPT 10471)      Pre-procedure diagnosis: dysphonia  Post-procedure diagnosis: same as above  Indication for procedure: Mr. Meadows is a 66 year old male with see above  Procedure(s): Fiberoptic Laryngoscopy    Details of Procedure: After informed consent was obtained, the patient was seated in the examination chair.  The areas of the nasopharynx as well as the hypopharynx were anesthetized with topical 4% lidocaine with 0.25% phenylephrine atomizer.  Examination of the base of tongue was performed first.  Attention was directed to any evidence of masses in the area or evidence of leukoplakia or candidal infection.  Attention was directed to the epiglottis where its size and position was determined and its movement on phonation of the vowel  e .  The piriform sinuses were then inspected for any mass lesions or pooling of secretions.  Attention was then directed to the larynx. The vocal folds were inspected for infection or any areas of leukoplakia, for masses, polypoid degeneration, or hemorrhage.  Having done this, the arytenoids and vocal processes were inspected for erythema or evidence of granuloma formation.  The posterior commissure was then inspected for evidence of inflammatory changes in the mucosa and heaping up of mucosal tissue. The patient was then instructed to say the vowel  e .  Adduction of vocal folds to the midline was observed for any evidence of paresis or paralysis of the larynx or asymmetry in rotation of the larynx to  the left or right. The patient was asked to breathe and the degree of abduction was noted bilaterally.  Subglottic view of the larynx was obtained for any additional mass lesions or mucosal changes.  Finally the post cricoid was examined for evidence of pooling of secretions, as well as the pharyngeal wall mucosa.   Anesthesia type: 0.25% phenylephrine    Findings:  Anatomic/physiological deviations: RNC, presbylarynx and resolved mucus regurgitation in the left pyriform sinus   Right vocal process: No restriction of mobility   Left vocal process: No restriction of mobility  Glottal gap: Complete glottal closure  Supraglottic structures: Normal  Hypopharynx: Normal     Estimated Blood Loss: minimal  Complications: None  Disposition: Patient tolerated the procedure well           Fiberoptic Endoscopic Evaluation of Swallowing (CPT 39056)  and Interpretation of Swallowing (CPT 61403)    Indications: See above notes for complete history and physical. Patient complains of dysphagia to solids and/or there is suggestion on history and endoscopic exam of the presence of dysphagia causing medical complaints.  Swallowing evaluation is being performed to assess the presence and degree of dysphagia, and to recommend a safe diet.     Pulmonary Status:  No PNA   Current Diet:              regular                                        thin liquids      Consistency Amounts:  Thin Liquid: sip   Puree: 1 tsp  Solid: cookies            Positioning: upright in a chair  Oral Peripheral Exam: See physical exam section.  Anatomic Notes: See Videostroboscopy report for assessment of anatomy and laryngeal functioning  Pharyngeal secretions prior to administration of liquid or food: Yes  Oral Phase Abnormal Findings: No abnormal behavior observed  Behavioral Adaptations: No abnormal behavior observed  Pharyngeal Phase Abnormal Findings: no penetration, no aspiration and no residue, no EPR     Recommended Diet:  regular                       "                  thin liquids              Review of Relevant Clinical Data   I personally reviewed:  Labs:  No results found for: TSH  Lab Results   Component Value Date     08/04/2022    CO2 22 08/04/2022    BUN 17.4 08/04/2022     No results found for: WBC, HGB, HCT, MCV, PLT  No results found for: PT, PTT, INR  No results found for: MICHAEL  No components found for: RHEUMATOIDFACTOR,  RF  No results found for: CRP  No components found for: CKTOT, URICACID  No components found for: C3, C4, DSDNAAB, NDNAABIFA  No results found for: MPOAB    Patient reported Quality of Life (QOL) Measures   Patient Supplied Answers To VHI Questionnaire  Voice Handicap Index (VHI-10) 6/23/2022   My voice makes it difficult for people to hear me 2   People have difficulty understanding me in a noisy room 2   My voice difficulties restrict my personal and social life.  2   I feel left out of conversations because of my voice 1   My voice problem causes me to lose income 0   I feel as though I have to strain to produce voice 1   The clarity of my voice is unpredictable 2   My voice problem upsets me 2   My voice makes me feel handicapped 2   People ask, \"What's wrong with your voice?\" 1   VHI-10 15         Patient Supplied Answers To EAT Questionnaire  Eating Assessment Tool (EAT-10) 6/23/2022   My swallowing problem has caused me to lose weight 0   My swallowing problem interferes with my ability to go out for meals 0   Swallowing liquids takes extra effort 0   Swallowing solids takes extra effort 1   Swallowing pills takes extra effort 1   Swallowing is painful 1   The pleasure of eating is affected by my swallowing 1   When I swallow food sticks in my throat 0   I cough when I eat 0   Swallowing is stressful 1   EAT-10 5         Patient Supplied Answers To CSI Questionnaire  Cough Severity Index (CSI) 6/23/2022   My cough is worse when I lie down 0   My coughing problem causes me to restrict my personal and social life 0   I tend " to avoid places because of my cough problem 0   I feel embarrassed because of my coughing problem 0   People ask, ''What's wrong?'' because I cough a lot 0   I run out of air when I cough 0   My coughing problem affects my voice 1   My coughing problem limits my physical activity 0   My coughing problem upsets me 0   People ask me if I am sick because I cough a lot 0   CSI Score 1       Impression & Plan     IMPRESSION: Mr. Meadows is a 66 year old male who is being seen for the followin. Dysphagia  - in the setting of endoscopic zenker's diverticulectomy in 2018  - now with sore throat since 2021 after COVID  - overall throat is better however in the work up he was found to have his zenker's back  - is symptomatic but not as much as before  - Xray Video Swallow Exam today reviewed at rounds shows a zenker's diverticulum without EPR, this is similar to the FEES however on the scope he did have saliva regurgitate in the left pyriform and also very slight puree EPR with the first bite  - discussed that his sore throat symptoms are likely more due to his muscle tension dypshonia rather than the pouch  - however he does have slight symptoms from the zenker's so would recommend surgery  - he would like to get this done since he has been waiting for this for a few months  - discussed given he has favorable anatomy an endoscopic CO2 laser myotomy is indicated  - risks, benefits and alternatives were discussed   - underwent diverticulectomy on 8/3/22 with CO2 laser and dilation to 16.5mmHg  - he had post-op afib- undergoing work up for this  - symptoms 2022 include some dryness in the throat, however overall doing well since surgery  - FEES shows safe swallow and resolved EPR  - we cleared his swallow today and that he should avoid any foods that he finds trouble him  - discussed that he can follow with me as needed going forward unless he has new swallow issues  - offered health psychology evaluation - but he  declined  Plan  - observation  - we will have Michael from our financial team reach out to him after he is back from his cruise the 26th 2. 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 All documentation has been reviewed by the aforementioned provider prior to being entered into the official medical record.     Azra Cho MD    Laryngology    [facility]  Department of  Otolaryngology - Head and Neck Surgery  Clinics & Surgery Center  [address] [place] [id number]  Appointment line: [phone]  Fax: [phone]  [email or web address]

## 2022-09-09 NOTE — PROGRESS NOTES
Speech-Language Pathology Department   EVALUATION  [facility]  Clinical Swallow Evaluation Visualized Under Endoscopy Performed by Dr Cho  09/09/22 1100       Present No   General Information   Type Of Visit Initial   Start Of Care Date 09/09/22   Referring Physician Dr Azra Cho   Orders Evaluate And Treat   Orders Comment Clinical Swallow Evaluation Visualized Under Endoscopy Performed by Dr Cho   Medical Diagnosis Oropharyngeal dysphagia   Precautions/limitations No Known Precautions/limitations   Hearing Judged to be adequate in a clinical setting   Pertinent History of Current Problem/OT: Additional Occupational Profile Info Chris Meadows is a 66 year old male with a PMH of Zenker's diverticulum s/p diverticulotomy in 2018. Reports this spring he started to have a sore throat. He completed an esophagram as part of the work up and found a recurrent Zenker's diverticulum. Pt had a CP myotomy performed by Dr. Cho on 8/3 and presents today for follow up. Pt was seen in conjunction with ENT visit.   Respiratory Status Room air   Prior Level Of Function Swallowing   Prior Level Of Function Comment Regular diet with thin liquids   Patient Role/employment History Retired   Living Environment [place]   General Observations Pt hightly alert and cooperative.   Clinical Swallow Evaluation   Oral Musculature generally intact   Structural Abnormalities none present   Dentition present and adequate   Mucosal Quality good   Mandibular Strength and Mobility intact   Lingual Strength and Mobility WFL   Velar Elevation intact   Buccal Strength and Mobility intact   Laryngeal Function Swallow   Additional evaluation(s) completed today No   Clinical Swallow Eval: Thin Liquid Texture Trial   Mode of Presentation, Thin Liquids straw;fed by clinician   Volume of Liquid or Food Presented 4 oz   Oral Phase of Swallow WFL   Pharyngeal Phase of Swallow  intact   Diagnostic Statement No penetration, no aspiraiton. Minimal residue in the pyriform and vallecula. No regurgitation of bolus from the UES   Clinical Swallow Eval: Puree Solid Texture Trial   Mode of Presentation, Puree spoon;fed by clinician   Volume of Puree Presented 2 teaspoons   Oral Phase, Puree WFL   Pharyngeal Phase, Puree intact   Diagnostic Statement No penetration, no aspiraiton. Minimal residue in the pyriform and vallecula. No regurgitation of bolus from the UES   Clinical Swallow Eval: Regular (Solid)   Mode of Presentation self-fed   Volume Presented 1 cookie   Oral Phase WFL   Pharyngeal Phase intact   Diagnostic Statement No penetration, no aspiraiton. Minimal residue in the pyriform and vallecula. No regurgitation of bolus from the UES   Swallow Compensations   Swallow Compensations No compensations were used   Educational Assessment   Barriers to Learning No barriers   Esophageal Phase of Swallow   Patient reports or presents with symptoms of esophageal dysphagia Yes   Esophageal sweep performed during today s vidofluoroscopic exam  No   Esophageal comments CP myotomy on 8/3   Swallow Eval: Clinical Impressions   Skilled Criteria for Therapy Intervention No problems identified which require skilled intervention   Functional Assessment Scale (FAS) 7   Treatment Diagnosis Functional oropharyngeal swallow   Diet texture recommendations Thin liquids (level 0);Regular diet   Recommended Feeding/Eating Techniques alternate between small bites and sips of food/liquid   Predicted Duration of Therapy Intervention (days/wks) Evaluation only   Risks and Benefits of Treatment have been explained. Yes   Patient, family and/or staff in agreement with Plan of Care Yes   Clinical Impression Comments Pt presents today with a safe, functional oropharyngeal swallow. No penetration, no aspiration and no regurgitation of saliva or p.o. trials today from the UES into the pyriforms. Pt demonstrates timely  swallow response. Pt was educated on the anatomy and the results of his swallow assessment. Pt denies any concerns. Therefore recommend pt to resume a regular diet with thin liquids. Pt was encouraged to reach out if he has any further concerns regarding swallowing. Pt agrees with the plan of care.   Total Session Time   SLP Rustam: oral/pharyngeal swallow function, clinical minutes (22795) 20   Total Evaluation Time 20     Thank you for the referral of Chris Meadows. If you have any questions about this report, please contact me using the information below.    Bety Perez MS, CCC-SLP  Speech-Language Pathology  [facility]  Department of Otolaryngology/D&T - 4th floor  Phone: [phone]  Email: [email or web address]

## 2022-09-09 NOTE — LETTER
2022       RE: Chris Meadows  [address] [facility] [address]     Dear Colleague,    Thank you for referring your patient, Chris Meadows, to the [facility] [place] at [facility]. Please see a copy of my visit note below.        [facility]   at the [facility]   Otolaryngology Clinic     Patient: Chris Meadows    MRN: [id number]    : 1955    Age/Gender: 66 year old male  Date of Service: 2022  Rendering Provider:   Azra Cho MD     Chief Complaint   Dysphagia  Zenker's diverticulum s/p diverticulectomy, CO2 laser, CRE dilation to 16.5 mmHg 8/3/22  Interval History   HISTORY OF PRESENT ILLNESS: Mr. Meadows is a 66 year old male is being followed for dysphagia and post op follow-up. he was initially seen on 22. Please refer to this note for full history.     Today, he presents for follow up. he reports:  - he has done well post operatively   - he did eat triscuits and noticed scratchy throat  - overall slightly dry in throat  - no pain or regurgitation  - no sensation of food sticking  - had bouts of racing heart recently   - he was not directly found to have atrial fibrillation   - however he then had increased heart rate while exercising  - continues to have anxiety over this  - he is under workup for atrial fibrillation and currently has on a Holter monitor  - he is going on a cruise next week in [place] and [place] and will not be able to complete the full trial of his Holter     PAST MEDICAL HISTORY:   Past Medical History:   Diagnosis Date     Allergic rhinitis, cause unspecified      Diverticulosis of colon (without mention of hemorrhage)      Hallux malleus      Intestinal disaccharidase deficiencies and disaccharide malabsorption      Osteoarthrosis, unspecified whether generalized or localized, lower leg      Other and unspecified hyperlipidemia      Unspecified sleep  apnea        PAST SURGICAL HISTORY:   Past Surgical History:   Procedure Laterality Date     COMBINED REPAIR PTOSIS WITH BLEPHAROPLASTY BILATERAL  7/25/2013    Procedure: COMBINED REPAIR PTOSIS WITH BLEPHAROPLASTY BILATERAL;  BILATERAL UPPER LID BLEPHAROPLASTY AND PTOSIS REPAIR  ;  Surgeon: River Lazo MD;  Location:  [facility]     ESOPHAGOSCOPY FLEXIBLE N/A 8/3/2022    Procedure: flexible esophagoscopy and balloon dilation;  Surgeon: Azra Cho MD;  Location: [facility] OR     LASER CO2 LARYNGOSCOPY N/A 8/3/2022    Procedure: endoscopic with carbon dioxide laser;  Surgeon: Azra Cho MD;  Location: [facility] OR     MYOTOMY CRICOPHARYNGEAL N/A 8/3/2022    Procedure: MYOTOMY, CRICOPHARYNGEAL,;  Surgeon: Azra Cho MD;  Location: [facility] OR       CURRENT MEDICATIONS:   Current Outpatient Medications:      acetaminophen (TYLENOL) 325 MG tablet, Take 1-2 tablets (325-650 mg) by mouth every 4 hours as needed for mild pain or fever, Disp: 100 tablet, Rfl: 0     Ascorbic Acid (VITAMIN C PO), Take 500 mg by mouth., Disp: , Rfl:      chromium 200 MCG CAPS, Take 200 mcg by mouth daily., Disp: , Rfl:      Cinnamon Bark POWD, , Disp: , Rfl:      clindamycin (CLEOCIN) 150 MG capsule, Take 1 capsule (150 mg) by mouth 3 times daily, Disp: 24 capsule, Rfl: 0     co-enzyme Q-10 100 MG CAPS, Take  by mouth daily., Disp: , Rfl:      dhea 25 MG TABS, Take 25 mg by mouth daily., Disp: , Rfl:      glucosamine-chondroitin 500-400 MG CAPS, Take 1 capsule by mouth daily., Disp: , Rfl:      multivitamin, therapeutic with minerals (MULTI-VITAMIN) TABS, Take 1 tablet by mouth daily., Disp: , Rfl:      Omega-3 Fatty Acids (OMEGA-3 FISH OIL PO), Take 500 mg by mouth., Disp: , Rfl:      oxyCODONE (ROXICODONE) 5 MG tablet, Take 1 tablet (5 mg) by mouth every 4 hours as needed for moderate to severe pain, Disp: 6 tablet, Rfl: 0     phenol (CHLORASEPTIC) 1.4 % spray, Take 1 spray (1 mL) by mouth every hour as needed for sore throat, Disp: 20 mL, Rfl: 0      red yeast rice 600 MG CAPS, Take 600 mg by mouth daily., Disp: , Rfl:      JANELLE LORENZO, , Disp: , Rfl:      vitamin D (ERGOCALCIFEROL) 49267 UNIT capsule, Take 400 Units by mouth daily., Disp: , Rfl:      VITAMIN E NATURAL PO, Take  by mouth., Disp: , Rfl:   No current facility-administered medications for this visit.    Facility-Administered Medications Ordered in Other Visits:      barium sulfate 40% (VARIBAR THIN HONEY) oral suspension 25 mL, 25 mL, Oral, Once, Haja Sow MD    ALLERGIES: Hydrocodone-acetaminophen    SOCIAL HISTORY:    Social History     Socioeconomic History     Marital status:      Spouse name: Not on file     Number of children: Not on file     Years of education: Not on file     Highest education level: Not on file   Occupational History     Not on file   Tobacco Use     Smoking status: Former Smoker     Quit date: 1988     Years since quittin.1     Smokeless tobacco: Never Used   Substance and Sexual Activity     Alcohol use: No     Drug use: No     Sexual activity: Never   Other Topics Concern     Parent/sibling w/ CABG, MI or angioplasty before 65F 55M? Not Asked   Social History Narrative     Not on file     Social Determinants of Health     Financial Resource Strain: Not on file   Food Insecurity: Not on file   Transportation Needs: Not on file   Physical Activity: Not on file   Stress: Not on file   Social Connections: Not on file   Intimate Partner Violence: Not on file   Housing Stability: Not on file         FAMILY HISTORY: No family history on file.   Non-contributory for problems with anesthesia    REVIEW OF SYSTEMS:   The patient was asked a 14 point review of systems regarding constitutional symptoms, eye symptoms, ears, nose, mouth, throat symptoms, cardiovascular symptoms, respiratory symptoms, gastrointestinal symptoms, genitourinary symptoms, musculoskeletal symptoms, integumentary symptoms, neurological symptoms, psychiatric symptoms, endocrine  symptoms, hematologic/lymphatic symptoms, and allergic/ immunologic symptoms.   The pertinent factors have been included in the HPI and below.  Patient Supplied Answers to Review of Systems   ENT ROS 9/2/2022   Psychology: Frequently feeling anxious   Ears, Nose, Throat: Hearing loss, Nasal congestion or drainage   Musculoskeletal: Back pain, Neck pain       Physical Examination   The patient underwent a physical examination as described below. The pertinent positive and negative findings are summarized after the description of the examination.  Constitutional: The patient's developmental and nutritional status was assessed. The patient's voice quality was assessed.  Head and Face: The head and face were inspected for deformities. The sinuses were palpated. The salivary glands were palpated. Facial muscle strength was assessed bilaterally.  Eyes: Extraocular movements and primary gaze alignment were assessed.  Ears, Nose, Mouth and Throat: The ears and nose were examined for deformities. The nasal septum, mucosa, and turbinates were inspected by anterior rhinoscopy. The lips, teeth, and gums were examined for abnormalities. The oral mucosa, tongue, palate, tonsils, lateral and posterior pharynx were inspected for the presence of asymmetry or mucosal lesions.    Neck: The tracheal position was noted, and the neck mass palpated to determine if there were any asymmetries, abnormal neck masses, thyromegally, or thyroid nodules.  Respiratory: The nature of the breathing and chest expansion/symmetry was observed.  Cardiovascular: The patient was examined to determine the presence of any edema or jugular venous distension.  Abdomen: The contour of the abdomen was noted.  Lymphatic: The patient was examined for infraclavicular lymphadenopathy.  Musculoskeletal: The patient was inspected for the presence of skeletal deformities.  Extremities: The extremities were examined for any clubbing or cyanosis.  Skin: The skin was  examined for inflammatory or neoplastic conditions.  Neurologic: The patient's orientation, mood, and affect were noted. The cranial nerve  functions were examined.  Other pertinent positive and negative findings on physical examination:   OC/OP: no lesions, uvula midline, soft palate elevates symmetrically   Neck: no lesions, no TH tenderness to palpation  All other physical examination findings were within normal limits and noncontributory.    Procedures   Flexible laryngoscopy (CPT 95399)      Pre-procedure diagnosis: dysphonia  Post-procedure diagnosis: same as above  Indication for procedure: Mr. Meadows is a 66 year old male with see above  Procedure(s): Fiberoptic Laryngoscopy    Details of Procedure: After informed consent was obtained, the patient was seated in the examination chair.  The areas of the nasopharynx as well as the hypopharynx were anesthetized with topical 4% lidocaine with 0.25% phenylephrine atomizer.  Examination of the base of tongue was performed first.  Attention was directed to any evidence of masses in the area or evidence of leukoplakia or candidal infection.  Attention was directed to the epiglottis where its size and position was determined and its movement on phonation of the vowel  e .  The piriform sinuses were then inspected for any mass lesions or pooling of secretions.  Attention was then directed to the larynx. The vocal folds were inspected for infection or any areas of leukoplakia, for masses, polypoid degeneration, or hemorrhage.  Having done this, the arytenoids and vocal processes were inspected for erythema or evidence of granuloma formation.  The posterior commissure was then inspected for evidence of inflammatory changes in the mucosa and heaping up of mucosal tissue. The patient was then instructed to say the vowel  e .  Adduction of vocal folds to the midline was observed for any evidence of paresis or paralysis of the larynx or asymmetry in rotation of the larynx to  the left or right. The patient was asked to breathe and the degree of abduction was noted bilaterally.  Subglottic view of the larynx was obtained for any additional mass lesions or mucosal changes.  Finally the post cricoid was examined for evidence of pooling of secretions, as well as the pharyngeal wall mucosa.   Anesthesia type: 0.25% phenylephrine    Findings:  Anatomic/physiological deviations: RNC, presbylarynx and resolved mucus regurgitation in the left pyriform sinus   Right vocal process: No restriction of mobility   Left vocal process: No restriction of mobility  Glottal gap: Complete glottal closure  Supraglottic structures: Normal  Hypopharynx: Normal     Estimated Blood Loss: minimal  Complications: None  Disposition: Patient tolerated the procedure well           Fiberoptic Endoscopic Evaluation of Swallowing (CPT 67133)  and Interpretation of Swallowing (CPT 76183)    Indications: See above notes for complete history and physical. Patient complains of dysphagia to solids and/or there is suggestion on history and endoscopic exam of the presence of dysphagia causing medical complaints.  Swallowing evaluation is being performed to assess the presence and degree of dysphagia, and to recommend a safe diet.     Pulmonary Status:  No PNA   Current Diet:              regular                                        thin liquids      Consistency Amounts:  Thin Liquid: sip   Puree: 1 tsp  Solid: cookies            Positioning: upright in a chair  Oral Peripheral Exam: See physical exam section.  Anatomic Notes: See Videostroboscopy report for assessment of anatomy and laryngeal functioning  Pharyngeal secretions prior to administration of liquid or food: Yes  Oral Phase Abnormal Findings: No abnormal behavior observed  Behavioral Adaptations: No abnormal behavior observed  Pharyngeal Phase Abnormal Findings: no penetration, no aspiration and no residue, no EPR     Recommended Diet:  regular                       "                  thin liquids              Review of Relevant Clinical Data   I personally reviewed:  Labs:  No results found for: TSH  Lab Results   Component Value Date     08/04/2022    CO2 22 08/04/2022    BUN 17.4 08/04/2022     No results found for: WBC, HGB, HCT, MCV, PLT  No results found for: PT, PTT, INR  No results found for: MICHAEL  No components found for: RHEUMATOIDFACTOR,  RF  No results found for: CRP  No components found for: CKTOT, URICACID  No components found for: C3, C4, DSDNAAB, NDNAABIFA  No results found for: MPOAB    Patient reported Quality of Life (QOL) Measures   Patient Supplied Answers To VHI Questionnaire  Voice Handicap Index (VHI-10) 6/23/2022   My voice makes it difficult for people to hear me 2   People have difficulty understanding me in a noisy room 2   My voice difficulties restrict my personal and social life.  2   I feel left out of conversations because of my voice 1   My voice problem causes me to lose income 0   I feel as though I have to strain to produce voice 1   The clarity of my voice is unpredictable 2   My voice problem upsets me 2   My voice makes me feel handicapped 2   People ask, \"What's wrong with your voice?\" 1   VHI-10 15         Patient Supplied Answers To EAT Questionnaire  Eating Assessment Tool (EAT-10) 6/23/2022   My swallowing problem has caused me to lose weight 0   My swallowing problem interferes with my ability to go out for meals 0   Swallowing liquids takes extra effort 0   Swallowing solids takes extra effort 1   Swallowing pills takes extra effort 1   Swallowing is painful 1   The pleasure of eating is affected by my swallowing 1   When I swallow food sticks in my throat 0   I cough when I eat 0   Swallowing is stressful 1   EAT-10 5         Patient Supplied Answers To CSI Questionnaire  Cough Severity Index (CSI) 6/23/2022   My cough is worse when I lie down 0   My coughing problem causes me to restrict my personal and social life 0   I tend " to avoid places because of my cough problem 0   I feel embarrassed because of my coughing problem 0   People ask, ''What's wrong?'' because I cough a lot 0   I run out of air when I cough 0   My coughing problem affects my voice 1   My coughing problem limits my physical activity 0   My coughing problem upsets me 0   People ask me if I am sick because I cough a lot 0   CSI Score 1       Impression & Plan     IMPRESSION: Mr. Meadows is a 66 year old male who is being seen for the followin. Dysphagia  - in the setting of endoscopic zenker's diverticulectomy in 2018  - now with sore throat since 2021 after COVID  - overall throat is better however in the work up he was found to have his zenker's back  - is symptomatic but not as much as before  - Xray Video Swallow Exam today reviewed at rounds shows a zenker's diverticulum without EPR, this is similar to the FEES however on the scope he did have saliva regurgitate in the left pyriform and also very slight puree EPR with the first bite  - discussed that his sore throat symptoms are likely more due to his muscle tension dypshonia rather than the pouch  - however he does have slight symptoms from the zenker's so would recommend surgery  - he would like to get this done since he has been waiting for this for a few months  - discussed given he has favorable anatomy an endoscopic CO2 laser myotomy is indicated  - risks, benefits and alternatives were discussed   - underwent diverticulectomy on 8/3/22 with CO2 laser and dilation to 16.5mmHg  - he had post-op afib- undergoing work up for this  - symptoms 2022 include some dryness in the throat, however overall doing well since surgery  - FEES shows safe swallow and resolved EPR  - we cleared his swallow today and that he should avoid any foods that he finds trouble him  - discussed that he can follow with me as needed going forward unless he has new swallow issues  - offered health psychology evaluation - but he  declined  Plan  - observation  - we will have Michael from our financial team reach out to him after he is back from his cruise the 26th 2. 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 All documentation has been reviewed by the aforementioned provider prior to being entered into the official medical record.     Azra Cho MD    Laryngology    [facility]  Department of  Otolaryngology - Head and Neck Surgery  [facility]  [address] [place] [address]  Appointment line: [phone]  Fax: [phone]  [email or web address]          Again, thank you for allowing me to participate in the care of your patient.      Sincerely,    Azra Cho MD

## 2022-09-09 NOTE — PATIENT INSTRUCTIONS
1.  You were seen in the ENT Clinic today by . If you have any questions or concerns after your appointment, please call [phone]. Press option #1 for scheduling related needs. Press option #3 for Nurse advice.    2.   has recommended  the following:   -  voice therapy    3.  Plan is to return to clinic as needed      Ann Monroy LPN  [phone]  [facility] Otolaryngology

## 2022-11-02 ENCOUNTER — TELEPHONE (OUTPATIENT)
Dept: OTOLARYNGOLOGY | Facility: CLINIC | Age: 67
End: 2022-11-02

## 2022-11-02 NOTE — TELEPHONE ENCOUNTER
[facility] Call Center    Phone Message    May a detailed message be left on voicemail: yes     Reason for Call: Other: Pt wondering if its okay to come in person , because he can not do video visit . Please reach out to pt to disucss. Thank you       Action Taken: Message routed to:  [facility] ([facility]): ent    Travel Screening: Not Applicable

## 2022-11-02 NOTE — TELEPHONE ENCOUNTER
[facility] Call Center    Phone Message    May a detailed message be left on voicemail: yes     Reason for Call: Other: Pt was called last night after hours to be informed that his appt today would not work and was auto rescheduled to tomorrow 11/03/22 for a video visit. Pt does not do video visits. Does not know how and does not have the equipment to do so. Pt upset as he waited 2 months specifically to get an in person appt only to have it rescheduled. Please call pt to reschedule to an IN PERSON visit. Thank you.      Action Taken: Message routed to:  [facility] ([facility]): ENT    Travel Screening: Not Applicable

## 2022-11-16 ENCOUNTER — OFFICE VISIT (OUTPATIENT)
Dept: OTOLARYNGOLOGY | Facility: CLINIC | Age: 67
End: 2022-11-16
Payer: MEDICARE

## 2022-11-16 DIAGNOSIS — R07.0 THROAT PAIN: ICD-10-CM

## 2022-11-16 DIAGNOSIS — R49.0 DYSPHONIA: Primary | ICD-10-CM

## 2022-11-16 DIAGNOSIS — J38.3 VOCAL CORD BOWING: ICD-10-CM

## 2022-11-16 PROCEDURE — 92507 TX SP LANG VOICE COMM INDIV: CPT | Mod: GN | Performed by: SPEECH-LANGUAGE PATHOLOGIST

## 2022-11-16 NOTE — PATIENT INSTRUCTIONS
Herve Perez, thanks for coming in today. We discussed ways to keep your throat happy and healthy and worked on some voice exercises to promote easy, clear voicing and respiratory engagement with speaking. Below you'll find the information and exercises that we'd like you to do until your next session, which is on 12/1 with our colleague Alejandra Maher.    Colin & Lan    Vocal Hygiene - Taking Good Care of Your Larynx       Maintain a Pleasant Environment for your Vocal Folds    The larynx likes to be in an environment that is moist, cool, and non-toxic.  Therefore:    Don't smoke - anything.  Avoid smoke and other inhalants if they bother you.  Try to maintain adequate humidity, especially in your bedroom at night.    Maintain Good Hydration    The mucosal covering of the vocal folds must be wet and slippery in order to vibrate optimally.  Therefore, good hydration is a high priority.  There are two kinds of hydration you should maintain: 1) systemic hydration, which is the entire body's internal hydration; and 2) topical, or surface hydration, which keeps the epithelial surface of the vocal folds slippery enough to vibrate.      Systemic Hydration    Drink enough fluids.  The exact amount is different for each individual, depending on metabolic needs (activities, health status, medications), dietary habits (amount of fruits and vegetables or other moist foods), physical activity, and extent of voice use.      A good rule of thumb is that your urine should be clear or very pale throughout the day, and you should not feel the need to clear your throat.  Drink more if your athletic endeavors or extent of voice use demands more.  But be reasonable. Too much water can be unhealthy, and too much in too short a time will not help you, either.    Just remember that it takes time for your body to process the water you drink, so think of systemic hydration as a long-term solution.  While it can be soothing to the back of your  throat in the moment if not something that touches your vocal fold directly.  For short-term benefit try topical hydration as outlined below    Topical Hydration    Humidity and Steam - The only forms of topical hydration the truly affect the level of the larynx are humidity and steam.  This can give you a leg up for 30 to 60 minutes, but do not expect the effects to linger for much longer than that.    Humidity can be especially helpful overnight.  Just remember if you use a room humidifier to always keep it clean and follow the  screening guidelines.    Steam is useful if you are feeling dry, sticky, or especially fatigued in the moment.  You can start with a (free and the cheap) method such as boiling a pot of water and pulling it off the stove, or sitting in the bathroom with the shower on hot.  Breathing through your mouth and gently hum on a comfortable pitch on the way out.  The vibration from gentle humming incorporate condensation into the secretions more effectively    To help the back of your throat, mouth, and base of tongue gargling can also be very helpful, and it is best if you use saline (1/4 teaspoon of kosher salt or purified sea salt, and 1/4 teaspoon of baking soda for 8 ounces of water).  You can do this on and off during your day, and is especially helpful if you find yourself clearing your throat, coughing, or just feel a sensation of irritation/soreness.    Lozenges can be helpful for stimulating saliva and helping you feel more lubricated, but be sure to avoid anything that has menthol as is active ingredient as this can have a long-term effect of drying out the tissues of her throat.  95% of cough drops in the world have menthol after active ingredient even if it is not the labeled flavor.  The only way to know is to check the back of the package for active ingredients.  The most common nonmenthol-based cough drops are Ludens wild hook, [employer], and a subbrand of Sloan  "called [place] \"Breezers\", but to be honest hard candy works just fine.    Cup and Bubble Exercises      WHY:  Though these exercises seems (and feels) silly they are helpful for a number of reasons.  First, they make you use your air generously and consistently, helping you to coordinate your breath and your voice.  Second, they lengthen and narrow the vocal tract with the straw.  This narrowing (or semi-occlusion in scientific terms) creates back pressure in your throat which has been shown to help the vocal folds vibrate more easily and reduce how hard some of the other muscles are squeezing.    To practice breathing: Start off in a forward leaning position with your elbows on your knees.  Feel the expansion into your belly and ribcage as you inhale and gentle contraction in the same area as you exhale.  Let yourself sit back up and maintain that pattern.    HOW:    With Water - Fill the cup (or bottle) about 2 inches full of water. Blow bubbles through the straw while keeping your voice on. (kind of like making an  ooooo  sound through straw).Keep the water bubbling the whole time. That means your air is moving consistently.      Without Water - make sound through the straw (like it's a kazoo).  Make sure you are using your air freely.  You can hold your hand in front of the straw to test, and you should be able to feel the air moving.    Or you can use an easy  ooo  sound (like in the word  hue ) tongue or lip bubble!    Feel how open and relaxed your throat feels when you practice these sounds. If the bubbling or air stops or it gets tight, don't sweat it.  Just breath, relax, and start again.  Across all the exercises make sure you are getting a nice low breath and feeling the steady inward motion of low abdominal muscles when making sound.    Practice 3-5 times a day for no more than 3 minutes, and whenever you feel fatigued.    Aren't sure if you are doing it right? Ask yourself these three questions:  Does " it feel easy?  Are the bubbles and voice consistent?  Do you feel that forward buzz?      What sounds to make:    Start off with just bubbles to give yourself a point of comparison for how little you need to work in your throat    Single pitches - use any comfortable pitch and sustain the note for as long as it feels free and easy, and your lips or tongue are bubbling.      Sighs - glide from high to low like you are sitting down in a comfortable chair after a long day of work.  The goal on this one is the low pitch, so don't worry about starting too high. This is about the low note    [place] - Start on a medium pitch and glide up just a bit and back down.  The goal on this one is the high pitch. This is about the high note     __________________________________________________________________________________________  Respiratory Phonatory Coordination  Breathing for speech and singing should use the strong muscles of your abdominal wall and low rib cage to do the heavy lifting, taking the burden off of the small muscles surrounding your larynx.  These exercises are designed to help you find that low respiratory engagement, raise your awareness of optimal lung volumes for speech, and then make these patterns habit so that you can carry them over to your daily voice use.  These exercises draw heavily from the work of Sarah Clark, an acting voice specialist and SLP.    STEP 1 - Finding low respiratory engagement  Lean forward in a chair with your elbows resting on your knees.  Exhale all your air out on a  sh  sound like you are gently shushing a baby.  Feel the gentle expansion in your belly and your rib cage along your lower back while inhale, and the gentle contraction in these same areas while you exhale.    Inhaling shouldn't be a dramatic event!  Aim for an easy / silent inhalation which you allow to just fall in rather than gasping or reaching for the air.  To feel this sensation try the following:  Expel all of  your air on a  sh  sound  With all of your air gone wait for a count of 3 more more  When you can't wait any longer simply release the muscles you are holding to keep the air out  It won't be a huge breath, but you will feel the air simply fall in.  Repeat this two more times to get accustomed to allowing rather than forcing your air in.  Now return to easy breathing in and out (without the need to wait after the exhale) trying to maintain the sensation of that easy silent inhalation  STEP 2 - Awareness of lung capacity  Ensuring that you take adequate breath before you start to speak or sing allows you to use the recoil forces of your rib-cage and abdominal wall to your benefit, while speaking too low on lung volume makes these forces work against you.  These exercises are designed to make you aware of these lung volumes so that you can use your air to best effect.  Recognize the upper and lower limits  Inhale as deeply as you possibly can.   Exhale as deeply as you possibly can.   Repeat 2 times paying attention to the amount of effort it takes, and any physical tension in your neck, shoulders, abdomen, and rib-cage.  Doing this you'll begin to recognize that exhaling or inhaling to our maximum capacity can actually create MORE tension rather than less.  For best voice use you want inhale only to comfortably full lung volume.  For exhalation the next exercise will help you recognize when your lung volume is too low to best support your voice.  Learning the landmarks for exhalation  Take a comfortably full breath (easy and silent as we practiced above)  In a chant-like voice connecting one word to the next count as high as you can on a single breath.  Pay special attention to the following:  Initially this is easy to do as the recoil forces from your ribcage and abdomen are adding to your respiratory drive  At some point toward the middle you will recognize that you have started to work a tiny bit harder to keep  voice going.  This is the mid-way point!  Toward the end you will have to work VERY hard to get your voice out, and there will be a notable decrease in voice quality.  This is the point of no return!  Repeat this 2-4 times recognizing those landmarks.   Ultimately, to be efficient with your voicing you will want to breathe after the mid-way point but before the point of no return, but you need to be aware of these sensations to make this change.    STEP 3 - Putting it all together  On these exercises, do your best to integrate steps one and two.  Maximum functional phrase length.   Take a comfortably full breath.    In a chant-like voice connecting one word to next begin counting.    Once you have passed the mid-way point but before you reach the point of no return allow yourself to take a low easy breath refilling your lung volume.  Take as much time for this as you need.  Continue this pattern counting up to the number 50.  Set phrase lengths:   For each of these you will use the same chant-like counting. Instead of taking the comfortably full breath from before, just take enough air to reach the goal number (5 for groups of 5, 2 for groups of 2) without feeling like you have TOO much left over, or reaching the point of no return. After doing several sets in a row you will find that balance point  Groups of 5 counting up to 25  Groups of 4 counting  up to 20  Groups of 3 counting up to 15  Groups of 2 counting up to 10  Groups of 1 counting up to 5  Varied phrase lengths:  Like the previous exercises you will use the chantlike flowing counting.  But this time instead of having set phrase lengths YOU are in control of how small or large the phrase length is.  For example the first breath may take you to 15, the second only to 18, the third to 25, the fourth to 31, etc.  Reading passages:   a book, newspaper, poem, or just read your facebook newsfeed.  As you are reading think about taking the right amount of  breath for the coming sentence or phrase.  If at any point you have passed the mid-way point, give yourself permission to fill the tank back up before you reach the point of no return.  Keep the words connected to one another, to promote that sense of flow.  Feel free to make it chant-like at first, but with each repetition, let it be more like natural speech while keeping that flow and connection. Practice for 3 minutes then evaluate how things went.  Conversation:  Let yourself have a casual conversation with a friend or family member.  Make sure it's a light enough topic that you can let your attention drift to your breathing intermittently.  Check to make sure that you are allowing yourself to take that best low engaged comfortable breath, and that you are refilling regularly to avoid the point of no return.  Try to feel the same sense of airflow  throughout your speech, even if the chantlike aspect has been dialed down.

## 2022-11-16 NOTE — PROGRESS NOTES
"[facility] VOICE CLINIC  THERAPY NOTE (CPT 25283)    Patient: Chris Meadows  Date of Service: 11/16/2022  Referring physician: Dr. Cho  Impressions from most recent evaluation by Colin Fernandez CCC-SLP on 6/28/2022:  \"Chris Meadows is presenting today with throat discomfort and a history of Zenker's diverticulum status postsurgical repair in 2018.  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  Perceptually these findings manifest as intermittent strain, asthenia, and rough voice quality, that corresponds to moments of decreased respiratory drive and coordination of airflow, glottal control, and resonance.  In absence of discoordination increased muscular recruitment is noted and this corresponds to areas of tenderness on laryngeal palpation helping to explain the patient's persistent throat pain.  In addition to these elements laryngeal evaluation also showed some regurgitation from the UES consistent with ongoing presence of Zenker's diverticulum.  Please see Dr. Cho and Smita Dow CCC-SLP's notes from today's date for additional details regarding the patient's swallowing difficulties.  \"    SUBJECTIVE:  Since the patient's last session, they report the following:     Overall symptoms are a little worse    Feels his voice is slightly raspier since last week's heart surgery    Worst in the mornings    He continues to have heart and lung issues    Started cardiopulmonary rehab this week    Surgery 1 month ago    Not currently exercising much    Identified voice quality and effort as highest priorities with throat clearing as lower priority    Vocal demands may increase in Sabianism in the next few weeks    Due to significant limitations to clinical availability, therapy was not able to be initiated within the initial " certification period. Given patient's complaints are consistent with prior evaluation and have not changed substantively formal reevaluation is not warranted, and this is considered the start of the same episode of care from a therapeutic standpoint.      OBJECTIVE:  PATIENT REPORTED MEASURES:  Patient Specific Goal Metrics:  Dysponia SLP Goals 11/16/2022   How would you rate your speaking voice quality, if 0 is worst voice quality, and 10 is best voice? 6   How much effort is it to speak, if 0 is no extra effort and 10 is maximum effort? 6   How severe is your cough /throat clearing, if 0 is no cough at all and 10 is the worst cough? 4   How much does your voice problem bother you? Quite a bit   How much does your cough/throat-clearing problem bother you?            A little bit     *see end of note for standardized measures*    THERAPEUTIC ACTIVITIES    Counseling and Education:    Asked many questions about the nature of their symptoms, and I answered all of these thoroughly.    Instructed concepts and techniques for optimal vocal hygiene including:    Systemic hydration, including strategies for increasing daily water intake    Topical hydration - Gargling, saline nasal irrigation, humidification, steam, guaifenesin    Awareness and reduction of phonotraumatic behaviors    Avoiding cough and throat clearing    Cough and throat clear suppression strategies    Use non-mentholated cough drops    Strategies for sensation of increased mucus    Saline gargle    Substitute hot water for coffee    Semi-Occluded Vocal Tract (SOVT) exercises instructed to reduce laryngeal tension, promote vocal fold pliability, and coordinate respiration and phonation    Straw with water resistance was found to be most facilitating     Cleared throat following phonation on first several trials    Sustained phonation, glides, and voice vs. voiceless productions used to promote easy voicing and raise awareness of laryngeal  "tension    Ascending and descending glides utilized to promote vocal fold pliability    Good accuracy with moderate clinician support    Exercises to improve respiratory phonatory coordination     Discussed mechanics of breathing mechanisms    A counting task was utilized to promote patient recognition of increased respiratory engagement to counter recoil during exhalation, and habituate a low breath subsequent to this threshold, but before expiratory reserve volume    Patient reported improved awareness of these thresholds, and was able to operate within their boundaries with minimal clinician support    Concepts of optimal lung capacity were incorporated into the following exercises:    Maximal functional phrase length using chant like counting    Restricted phrase length in groups of 11 or less using chant-like counting    Varied phrase length using chant-like counting    Conversational speech      A regimen for home practice was instructed.    I provided handouts of today's therapeutic activities to facilitate practice.    ASSESSMENT/PLAN  PROGRESS TOWARD LONG TERM GOALS:   Minimal at this point, as this is first session, but good learning today    IMPRESSIONS: Throat Pain (R07.0) and Dysphonia (R49.0) in the context of Vocal Fold Bowing (J38.3), an imbalance in function of the intrinsic and extrinsic muscles of the larynx and Nonoptimal phonatory respiratory coordination. Chris's symptoms are a bit worse than his most recent evaluation. Today we discussed practices for laryngeal hygiene, SOVTs, and phonatory-respiratory coordination exercises. Chris was able to achieve an easy, clear voice in his exercises and demonstrated less frequent throat clearing when speaking in a \"project to the Christianity\" voice and focusing on breathing before running out of air.    PLAN: Alejandra Maher CCC-SLP will see Chris in 2 weeks, at which point they can continue to work on phonatory-respiratory coordination and potentially " "target throat discomfort with laryngeal massage.  For practice goals see AVS.     TOTAL SERVICE TIME: 60 minutes  TREATMENT (92503)  NO CHARGE FACILITY FEE (48686)    Today's session and note was completed in tandem with Lan Mg,  clinician in speech language hearing sciences. I was present during today's appointment and have reviewed / agree with the contents of this note.    Papa Fernandez M.M., M.A., CCC-SLP  Speech-Language Pathologist  Certificate of Vocology  [phone]    *this report was created in part through the use of computerized dictation software, and though reviewed following completion, some typographic errors may persist.  If there is confusion regarding any of this notes contents, please contact me for clarification.*    Patient Supplied Answers To Last 2 VHI Questionnaires  Voice Handicap Index (VHI-10) 6/23/2022   My voice makes it difficult for people to hear me 2   People have difficulty understanding me in a noisy room 2   My voice difficulties restrict my personal and social life.  2   I feel left out of conversations because of my voice 1   My voice problem causes me to lose income 0   I feel as though I have to strain to produce voice 1   The clarity of my voice is unpredictable 2   My voice problem upsets me 2   My voice makes me feel handicapped 2   People ask, \"What's wrong with your voice?\" 1   VHI-10 15        Patient Supplied Answers To Last 2 CSI Questionnaires  Cough Severity Index (CSI) 6/23/2022   My cough is worse when I lie down 0   My coughing problem causes me to restrict my personal and social life 0   I tend to avoid places because of my cough problem 0   I feel embarrassed because of my coughing problem 0   People ask, ''What's wrong?'' because I cough a lot 0   I run out of air when I cough 0   My coughing problem affects my voice 1   My coughing problem limits my physical activity 0   My coughing problem upsets me 0   People ask me if I am " sick because I cough a lot 0   CSI Score 1        Patient Supplied Answers To Last 2 EAT Questionnaires  Eating Assessment Tool (EAT-10) 6/23/2022   My swallowing problem has caused me to lose weight 0   My swallowing problem interferes with my ability to go out for meals 0   Swallowing liquids takes extra effort 0   Swallowing solids takes extra effort 1   Swallowing pills takes extra effort 1   Swallowing is painful 1   The pleasure of eating is affected by my swallowing 1   When I swallow food sticks in my throat 0   I cough when I eat 0   Swallowing is stressful 1   EAT-10 5        Patient Supplied Answers to Dyspnea Index Questionnaire:  No flowsheet data found.

## 2022-11-16 NOTE — LETTER
"11/16/2022       RE: Chris Meadows  [address] [facility] [address]     Dear Colleague,    Thank you for referring your patient, Chris Meadows, to the [facility] VOICE CLINIC [place] at [facility]. Please see a copy of my visit note below.    [facility] VOICE CLINIC  THERAPY NOTE (CPT 44622)    Patient: Chris Meadows  Date of Service: 11/16/2022  Referring physician: Dr. Cho  Impressions from most recent evaluation by Colin Fernandez CCC-SLP on 6/28/2022:  \"Chris Meadows is presenting today with throat discomfort and a history of Zenker's diverticulum status postsurgical repair in 2018.  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  Perceptually these findings manifest as intermittent strain, asthenia, and rough voice quality, that corresponds to moments of decreased respiratory drive and coordination of airflow, glottal control, and resonance.  In absence of discoordination increased muscular recruitment is noted and this corresponds to areas of tenderness on laryngeal palpation helping to explain the patient's persistent throat pain.  In addition to these elements laryngeal evaluation also showed some regurgitation from the UES consistent with ongoing presence of Zenker's diverticulum.  Please see Dr. Cho and Smita Dow CCC-SLP's notes from today's date for additional details regarding the patient's swallowing difficulties.  \"    SUBJECTIVE:  Since the patient's last session, they report the following:     Overall symptoms are a little worse    Feels his voice is slightly raspier since last week's heart surgery    Worst in the mornings    He continues to have heart and lung issues    Started cardiopulmonary rehab this week    Surgery 1 month ago    Not " currently exercising much    Identified voice quality and effort as highest priorities with throat clearing as lower priority    Vocal demands may increase in [employer] in the next few weeks    Due to significant limitations to clinical availability, therapy was not able to be initiated within the initial certification period. Given patient's complaints are consistent with prior evaluation and have not changed substantively formal reevaluation is not warranted, and this is considered the start of the same episode of care from a therapeutic standpoint.      OBJECTIVE:  PATIENT REPORTED MEASURES:  Patient Specific Goal Metrics:  Dysponia SLP Goals 11/16/2022   How would you rate your speaking voice quality, if 0 is worst voice quality, and 10 is best voice? 6   How much effort is it to speak, if 0 is no extra effort and 10 is maximum effort? 6   How severe is your cough /throat clearing, if 0 is no cough at all and 10 is the worst cough? 4   How much does your voice problem bother you? Quite a bit   How much does your cough/throat-clearing problem bother you?            A little bit     *see end of note for standardized measures*    THERAPEUTIC ACTIVITIES    Counseling and Education:    Asked many questions about the nature of their symptoms, and I answered all of these thoroughly.    Instructed concepts and techniques for optimal vocal hygiene including:    Systemic hydration, including strategies for increasing daily water intake    Topical hydration - Gargling, saline nasal irrigation, humidification, steam, guaifenesin    Awareness and reduction of phonotraumatic behaviors    Avoiding cough and throat clearing    Cough and throat clear suppression strategies    Use non-mentholated cough drops    Strategies for sensation of increased mucus    Saline gargle    Substitute hot water for coffee    Semi-Occluded Vocal Tract (SOVT) exercises instructed to reduce laryngeal tension, promote vocal fold pliability, and  coordinate respiration and phonation    Straw with water resistance was found to be most facilitating     Cleared throat following phonation on first several trials    Sustained phonation, glides, and voice vs. voiceless productions used to promote easy voicing and raise awareness of laryngeal tension    Ascending and descending glides utilized to promote vocal fold pliability    Good accuracy with moderate clinician support    Exercises to improve respiratory phonatory coordination     Discussed mechanics of breathing mechanisms    A counting task was utilized to promote patient recognition of increased respiratory engagement to counter recoil during exhalation, and habituate a low breath subsequent to this threshold, but before expiratory reserve volume    Patient reported improved awareness of these thresholds, and was able to operate within their boundaries with minimal clinician support    Concepts of optimal lung capacity were incorporated into the following exercises:    Maximal functional phrase length using chant like counting    Restricted phrase length in groups of 11 or less using chant-like counting    Varied phrase length using chant-like counting    Conversational speech      A regimen for home practice was instructed.    I provided handouts of today's therapeutic activities to facilitate practice.    ASSESSMENT/PLAN  PROGRESS TOWARD LONG TERM GOALS:   Minimal at this point, as this is first session, but good learning today    IMPRESSIONS: Throat Pain (R07.0) and Dysphonia (R49.0) in the context of Vocal Fold Bowing (J38.3), an imbalance in function of the intrinsic and extrinsic muscles of the larynx and Nonoptimal phonatory respiratory coordination. Chris's symptoms are a bit worse than his most recent evaluation. Today we discussed practices for laryngeal hygiene, SOVTs, and phonatory-respiratory coordination exercises. Chris was able to achieve an easy, clear voice in his exercises and  "demonstrated less frequent throat clearing when speaking in a \"project to the Religion\" voice and focusing on breathing before running out of air.    PLAN: Alejandra Gunnar CCC-SLP will see Chris in 2 weeks, at which point they can continue to work on phonatory-respiratory coordination and potentially target throat discomfort with laryngeal massage.  For practice goals see AVS.     TOTAL SERVICE TIME: 60 minutes  TREATMENT (04754)  NO CHARGE FACILITY FEE (39997)    Today's session and note was completed in tandem with Lan Mg,  clinician in speech language hearing sciences. I was present during today's appointment and have reviewed / agree with the contents of this note.    Papa Fernandez M.M., M.A., CCC-SLP  Speech-Language Pathologist  Certificate of Vocology  [phone]    *this report was created in part through the use of computerized dictation software, and though reviewed following completion, some typographic errors may persist.  If there is confusion regarding any of this notes contents, please contact me for clarification.*    Patient Supplied Answers To Last 2 VHI Questionnaires  Voice Handicap Index (VHI-10) 6/23/2022   My voice makes it difficult for people to hear me 2   People have difficulty understanding me in a noisy room 2   My voice difficulties restrict my personal and social life.  2   I feel left out of conversations because of my voice 1   My voice problem causes me to lose income 0   I feel as though I have to strain to produce voice 1   The clarity of my voice is unpredictable 2   My voice problem upsets me 2   My voice makes me feel handicapped 2   People ask, \"What's wrong with your voice?\" 1   VHI-10 15        Patient Supplied Answers To Last 2 CSI Questionnaires  Cough Severity Index (CSI) 6/23/2022   My cough is worse when I lie down 0   My coughing problem causes me to restrict my personal and social life 0   I tend to avoid places because of my cough problem " 0   I feel embarrassed because of my coughing problem 0   People ask, ''What's wrong?'' because I cough a lot 0   I run out of air when I cough 0   My coughing problem affects my voice 1   My coughing problem limits my physical activity 0   My coughing problem upsets me 0   People ask me if I am sick because I cough a lot 0   CSI Score 1        Patient Supplied Answers To Last 2 EAT Questionnaires  Eating Assessment Tool (EAT-10) 6/23/2022   My swallowing problem has caused me to lose weight 0   My swallowing problem interferes with my ability to go out for meals 0   Swallowing liquids takes extra effort 0   Swallowing solids takes extra effort 1   Swallowing pills takes extra effort 1   Swallowing is painful 1   The pleasure of eating is affected by my swallowing 1   When I swallow food sticks in my throat 0   I cough when I eat 0   Swallowing is stressful 1   EAT-10 5        Patient Supplied Answers to Dyspnea Index Questionnaire:  No flowsheet data found.        Again, thank you for allowing me to participate in the care of your patient.      Sincerely,    Colin Fernandez, SLP

## 2023-01-12 ENCOUNTER — OFFICE VISIT (OUTPATIENT)
Dept: OTOLARYNGOLOGY | Facility: CLINIC | Age: 68
End: 2023-01-12
Payer: MEDICARE

## 2023-01-12 DIAGNOSIS — J38.3 VOCAL CORD BOWING: ICD-10-CM

## 2023-01-12 DIAGNOSIS — R07.0 THROAT PAIN: ICD-10-CM

## 2023-01-12 DIAGNOSIS — R49.0 DYSPHONIA: Primary | ICD-10-CM

## 2023-01-12 PROCEDURE — 92507 TX SP LANG VOICE COMM INDIV: CPT | Mod: GN | Performed by: SPEECH-LANGUAGE PATHOLOGIST

## 2023-01-12 NOTE — PATIENT INSTRUCTIONS
"Hello!    It was a pleasure to see you today. Please complete the exercises below and remember, a few minutes of practice many times throughout the day is more important than one large practice session. If you have any questions about your strategies, feel free to contact me at [email or web address] or via Add[employer]. Please call [phone] for scheduling alterations or to be seen sooner in case of cancellations.     If your appointment is virtual, please remember to logon to [employer] a few minutes early to complete the questionnaires before your visit starts.     Home Exercise Program:  COUGH & THROAT CLEAR AVOIDANCE (ongoing and as needed)  -- Use these strategies whenever you feel like you're about to cough or clear your throat. Most people pick 2-3 favorites.    1. Swallow really hard, like getting a golf ball down. Feel your throat squeeze really tight.   2. Keep water with you to sip.  3. See if icy water or ice chips soothe your throat, or if warm/hot beverages soothe your throat. If so, keep some handy in an insulated cup. Fizzy bueno can also be distracting. Switch between hot and cold.   4. Hum or sigh on a \"Hmmm\" and feel a gentle vibration in your throat. Get creative at work or in conversations: \"hmm...\" \"mmhmm...\" \"ummm\"  5. BLOWFISH & 5: Inhale through your nose. Imagine you're blowing up a balloon, but keep your lips closed so your throat and cheeks balloon instead. Hold your breath and count to 5.   6. Silently yawn, letting your tongue relax flat like a puppy dog. (You could try looking up Luv Rinkube videos of someone else yawning)   7. Gargle a small sip of water. (Inhale first. Teeny tiny sips of water- less than you think you need. Turn your voice on \"uhhhh\" then bring your head back forward before you swallow) (Tilt your head side to side, stretch your tongue corner to corner, G G G G G)  8. Rescue Breaths   ---- Tongue Tuck-lightly touch the tip of your tongue to the roof of your mouth and " "focus on cool and quiet air sweeping and along the sides of your tongue as you breathe in and out; Really helpful when you're wearing a mask.  ---- Sniff & Blow-breathe slowly in through your nose like smelling a maxim then blow out with pursed lips and balloon your cheeks like blowing out a birthday candle OR Shhhh OR Sh sh sh sh sh   ---- Puppy Yawn & Blow-open your mouth and let your tongue rest flat against your bottom lip.  Take a quiet, yawny breath in and feel cool air sweep along your tongue and down the back your throat.  Then blowout with pursed lips and ballooned cheeks, like blowing out a birthday candle OR Shhhh OR Sh sh sh sh sh   9. Silently hold the cough in for 1 second. Next time, hold the cough in for 2 seconds, etc. Work your way toward 45 seconds of delay.   10. Gently clear your throat with a Capital H/hairball, or Trinidadian throaty \"achhh,\" or a silent \"ah ah ah\" like the Count from [employer]re[employer]    HELPFUL HINTS:  -- Place reminder notes with 2-3 strategies each around your home & work place  -- Recruit friends and family to help remind you if they hear you cough/throat clear   ____________________________________________________     PhoRTE Homework Form:  Start with a big breath. [employer] a  megaphone mouth - wide in front!  DATE: ____________________    8-10 repetitions of  ah  on a sustained, comfortable pitch. Imagine the microphone went out during a reading \"loud\". Time your duration and hold as long as you can.   1 2 3 4 5 6 7 8 9 10   # of seconds             (1 minute break)    8-10 repetitions of  ah  sliding from low to high to low pitch.   1 2 3 4 5 6 7 8 9 10   Completed             (1 minute break)    2 repetitions of 10 phrases, as though calling over a fence to your neighbor (slightly elevated pitch). Rep #1 ___ (1 min break) Rep #2 ___ (1 min break)    2 repetitions of those same 10 phrases, in an authoritative (or slightly lower pitch, sanders) voice. Rep #1 ___ (1 min break) Rep #2 " ___ (Done!)    Functional Phrases    How's my honey this morning?  What are we having for dinner?  What would you like me to get from the store?  What would you like to do this weekend?  I don't care.   What would you like to watch on tv?                    Thank you and have a great day!  Roselia Landis (voice), M.S., CCC-SLP  Speech-Language Pathologist  [facility] Voice [facility]  [phone]  [email or web address]  Pronouns: she/her/hers

## 2023-01-12 NOTE — LETTER
"1/12/2023       RE: Chris Meadows  [address] [facility] [address]     Dear Colleague,    Thank you for referring your patient, Chris Meadows, to the [facility] VOICE CLINIC [place] at [facility]. Please see a copy of my visit note below.    [facility] VOICE CLINIC  PROGRESS REPORT (CPT 97684)    Patient: Chris Meadows  Date of Service: 1/12/2023  Date of Last Service: 11/16/22  Number of Visits: 2  Referring Physician: Dr. Azra Cho  Primary Insurance[place] [employer] [place]    Impressions from most recent evaluation by Colin Fernandez CCC-SLP on 6/28/2022:  \"Chris Meadows is presenting today with throat discomfort and a history of Zenker's diverticulum status postsurgical repair in 2018.  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  Perceptually these findings manifest as intermittent strain, asthenia, and rough voice quality, that corresponds to moments of decreased respiratory drive and coordination of airflow, glottal control, and resonance.  In absence of discoordination increased muscular recruitment is noted and this corresponds to areas of tenderness on laryngeal palpation helping to explain the patient's persistent throat pain.  In addition to these elements laryngeal evaluation also showed some regurgitation from the UES consistent with ongoing presence of Zenker's diverticulum.  Please see Dr. Cho and Smita Dow CCC-SLP's notes from today's date for additional details regarding the patient's swallowing difficulties.  \"    SUBJECTIVE:  Since Chris's last session, he reports the following:   o He's had two heart surgeries and feels like he has more respiratory ability  o He reads out loud a couple times a month at [employer] and can now just read " through without planning and thinking about his breaths.   o His breathing still isn't back to pre-Covid levels, but is much better.   o He does still run out of breath.   o He's no longer using menthol drops all the time and does the 3x/day gargling- he's not sure if this makes much difference.   o He also stopped doing his voice exercises because he wasn't really noticing much difference.   o He still has to clear his throat frequently and tries to avoid, but can't help it.     OBJECTIVE:  Patient Specific Goal Metrics:  Dysponia SLP Goals 11/16/2022   How would you rate your speaking voice quality, if 0 is worst voice quality, and 10 is best voice? 6   How much effort is it to speak, if 0 is no extra effort and 10 is maximum effort? 6   How severe is your cough /throat clearing, if 0 is no cough at all and 10 is the worst cough? 4   How much does your voice problem bother you? Quite a bit   How much does your cough/throat-clearing problem bother you?            A little bit       THERAPEUTIC ACTIVITIES  Today Chris participated in the following therapeutic activities:  Counseling and Education:    Asked questions about the nature of his symptoms, and I answered all of these thoroughly.    I provided Chris with explanation and skilled instruction of:  Cough and throat clear habit cycle development and avoidance tasks.    We discussed the initial laryngeal irritants and development of hyper-sensitivity. We also discussed that inflamed nerves can sometimes send false signals to the brain and that a cough or throat clear is often simply covering the initial feeling of irritation, rather than solving the problem, while leading to further inflammation and hypersensitivity.     He verbalized understanding of this process and its role in ongoing symptoms.     I instructed and demonstrated alternative behaviors to provide a new sensory stimulation to the laryngeal tissue in an attempt to avoid coughing or throat clearing.      He was able to demonstrate strategies with fair accy.     We discussed placing reminder notes of strategies around his home and recruiting family/friends to help prompt strategy use between sessions.   Exercises to coordinate increased glottic closure with optimal airstream and minimized phonatory impact.     PhoRTE exercises were most facilitating and produced the most improvement in vocal clarity and intensity    He was able to produce approximately 8 seconds of vocal duration.    The functional phrase tasks of the PhoRTE protocol was included, with fair accy demonstrated in a slightly higher loud voice and slightly lower loud voice.     Patient demonstrated good learning, but will need practice and additional reinforcement    Instruction of Home Practice:    A revised regimen for home practice was instructed.    I provided an AVS of important notes of today's therapeutic activities to facilitate practice.    ASSESSMENT/PLAN  Progress toward long-term goals:  Adequate but incomplete progress; please see above    Impressions:  IMPRESSIONS: Throat Pain (R07.0) and Dysphonia (R49.0) in the context of Vocal Fold Bowing (J38.3)     Chris had a productive session of therapy today, working on throat clear avoidance strategies and introduction to PhoRTE tasks.  He will continue to work on his exercises on a daily basis, and work on incorporating the techniques into his daily activities. Speech therapy continues to be medically necessary for Chris to participate fully and engage in activities of daily living.     Plan:   I will see Chris in 2 weeks and will plan to check on full PhoRTE protocol and any questions about throat clear avoidance. Likely discharge.     For home practice goals, see AVS.     TOTAL SERVICE TIME: 60 minutes  TREATMENT (05259)  NO CHARGE FACILITY FEE    Roselia Copeland (voice) MDimaS., CCC-SLP  Speech-Language Pathologist  [facility] Voice [facility]  [phone]  [email or web address]  Pronouns:  "she/her/hers      *this report was created in part through the use of computerized dictation software, and though reviewed following completion, some typographic errors may persist.  If there is confusion regarding any of this notes contents, please contact me for clarification    OBJECTIVE FINDINGS  PATIENT REPORTED MEASURES  Patient Supplied Answers To Last 2 VHI Questionnaires  Voice Handicap Index (VHI-10) 6/23/2022   My voice makes it difficult for people to hear me 2   People have difficulty understanding me in a noisy room 2   My voice difficulties restrict my personal and social life.  2   I feel left out of conversations because of my voice 1   My voice problem causes me to lose income 0   I feel as though I have to strain to produce voice 1   The clarity of my voice is unpredictable 2   My voice problem upsets me 2   My voice makes me feel handicapped 2   People ask, \"What's wrong with your voice?\" 1   VHI-10 15            Patient Supplied Answers To Last 2 CSI Questionnaires  Cough Severity Index (CSI) 6/23/2022   My cough is worse when I lie down 0   My coughing problem causes me to restrict my personal and social life 0   I tend to avoid places because of my cough problem 0   I feel embarrassed because of my coughing problem 0   People ask, ''What's wrong?'' because I cough a lot 0   I run out of air when I cough 0   My coughing problem affects my voice 1   My coughing problem limits my physical activity 0   My coughing problem upsets me 0   People ask me if I am sick because I cough a lot 0   CSI Score 1            Patient Supplied Answers To Last 2 EAT Questionnaires  Eating Assessment Tool (EAT-10) 6/23/2022   My swallowing problem has caused me to lose weight 0   My swallowing problem interferes with my ability to go out for meals 0   Swallowing liquids takes extra effort 0   Swallowing solids takes extra effort 1   Swallowing pills takes extra effort 1   Swallowing is painful 1   The pleasure of " eating is affected by my swallowing 1   When I swallow food sticks in my throat 0   I cough when I eat 0   Swallowing is stressful 1   EAT-10 5           Patient Supplied Answers to Dyspnea Index Questionnaire:  No flowsheet data found.       Again, thank you for allowing me to participate in the care of your patient.      Sincerely,    Alejandra Maher, SLP

## 2023-01-12 NOTE — PROGRESS NOTES
"[facility] VOICE CLINIC  PROGRESS REPORT (CPT 93528)    Patient: Chris Meadows  Date of Service: 1/12/2023  Date of Last Service: 11/16/22  Number of Visits: 2  Referring Physician: Dr. Azra Cho  Primary Insurance: [facility] out of state    Impressions from most recent evaluation by Colin Fernandez CCC-SLP on 6/28/2022:  \"Chris Meadows is presenting today with throat discomfort and a history of Zenker's diverticulum status postsurgical repair in 2018.  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  Perceptually these findings manifest as intermittent strain, asthenia, and rough voice quality, that corresponds to moments of decreased respiratory drive and coordination of airflow, glottal control, and resonance.  In absence of discoordination increased muscular recruitment is noted and this corresponds to areas of tenderness on laryngeal palpation helping to explain the patient's persistent throat pain.  In addition to these elements laryngeal evaluation also showed some regurgitation from the UES consistent with ongoing presence of Zenker's diverticulum.  Please see Dr. Cho and Smita Dow CCC-SLP's notes from today's date for additional details regarding the patient's swallowing difficulties.  \"    SUBJECTIVE:  Since Chris's last session, he reports the following:   o He's had two heart surgeries and feels like he has more respiratory ability  o He reads out loud a couple times a month at [employer] and can now just read through without planning and thinking about his breaths.   o His breathing still isn't back to pre-Covid levels, but is much better.   o He does still run out of breath.   o He's no longer using menthol drops all the time and does the 3x/day gargling- he's not sure if this makes much difference.   o He also " stopped doing his voice exercises because he wasn't really noticing much difference.   o He still has to clear his throat frequently and tries to avoid, but can't help it.     OBJECTIVE:  Patient Specific Goal Metrics:  Dysponia SLP Goals 11/16/2022   How would you rate your speaking voice quality, if 0 is worst voice quality, and 10 is best voice? 6   How much effort is it to speak, if 0 is no extra effort and 10 is maximum effort? 6   How severe is your cough /throat clearing, if 0 is no cough at all and 10 is the worst cough? 4   How much does your voice problem bother you? Quite a bit   How much does your cough/throat-clearing problem bother you?            A little bit       THERAPEUTIC ACTIVITIES  Today Chris participated in the following therapeutic activities:  Counseling and Education:    Asked questions about the nature of his symptoms, and I answered all of these thoroughly.    I provided Chrsi with explanation and skilled instruction of:  Cough and throat clear habit cycle development and avoidance tasks.    We discussed the initial laryngeal irritants and development of hyper-sensitivity. We also discussed that inflamed nerves can sometimes send false signals to the brain and that a cough or throat clear is often simply covering the initial feeling of irritation, rather than solving the problem, while leading to further inflammation and hypersensitivity.     He verbalized understanding of this process and its role in ongoing symptoms.     I instructed and demonstrated alternative behaviors to provide a new sensory stimulation to the laryngeal tissue in an attempt to avoid coughing or throat clearing.     He was able to demonstrate strategies with fair accy.     We discussed placing reminder notes of strategies around his home and recruiting family/friends to help prompt strategy use between sessions.   Exercises to coordinate increased glottic closure with optimal airstream and minimized phonatory  impact.     PhoRTE exercises were most facilitating and produced the most improvement in vocal clarity and intensity    He was able to produce approximately 8 seconds of vocal duration.    The functional phrase tasks of the PhoRTE protocol was included, with fair accy demonstrated in a slightly higher loud voice and slightly lower loud voice.     Patient demonstrated good learning, but will need practice and additional reinforcement    Instruction of Home Practice:    A revised regimen for home practice was instructed.    I provided an AVS of important notes of today's therapeutic activities to facilitate practice.    ASSESSMENT/PLAN  Progress toward long-term goals:  Adequate but incomplete progress; please see above    Impressions:  IMPRESSIONS: Throat Pain (R07.0) and Dysphonia (R49.0) in the context of Vocal Fold Bowing (J38.3)     Chris had a productive session of therapy today, working on throat clear avoidance strategies and introduction to PhoRTE tasks.  He will continue to work on his exercises on a daily basis, and work on incorporating the techniques into his daily activities. Speech therapy continues to be medically necessary for Chris to participate fully and engage in activities of daily living.     Plan:   I will see Chris in 2 weeks and will plan to check on full PhoRTE protocol and any questions about throat clear avoidance. Likely discharge.     For home practice goals, see AVS.     TOTAL SERVICE TIME: 60 minutes  TREATMENT (25474)  NO CHARGE FACILITY FEE    Roselia Copeland (voice), M.S., CCC-SLP  Speech-Language Pathologist  [facility] Voice [facility]  [phone]  [email or web address]  Pronouns: she/her/hers      *this report was created in part through the use of computerized dictation software, and though reviewed following completion, some typographic errors may persist.  If there is confusion regarding any of this notes contents, please contact me for clarification    OBJECTIVE  "FINDINGS  PATIENT REPORTED MEASURES  Patient Supplied Answers To Last 2 VHI Questionnaires  Voice Handicap Index (VHI-10) 6/23/2022   My voice makes it difficult for people to hear me 2   People have difficulty understanding me in a noisy room 2   My voice difficulties restrict my personal and social life.  2   I feel left out of conversations because of my voice 1   My voice problem causes me to lose income 0   I feel as though I have to strain to produce voice 1   The clarity of my voice is unpredictable 2   My voice problem upsets me 2   My voice makes me feel handicapped 2   People ask, \"What's wrong with your voice?\" 1   VHI-10 15            Patient Supplied Answers To Last 2 CSI Questionnaires  Cough Severity Index (CSI) 6/23/2022   My cough is worse when I lie down 0   My coughing problem causes me to restrict my personal and social life 0   I tend to avoid places because of my cough problem 0   I feel embarrassed because of my coughing problem 0   People ask, ''What's wrong?'' because I cough a lot 0   I run out of air when I cough 0   My coughing problem affects my voice 1   My coughing problem limits my physical activity 0   My coughing problem upsets me 0   People ask me if I am sick because I cough a lot 0   CSI Score 1            Patient Supplied Answers To Last 2 EAT Questionnaires  Eating Assessment Tool (EAT-10) 6/23/2022   My swallowing problem has caused me to lose weight 0   My swallowing problem interferes with my ability to go out for meals 0   Swallowing liquids takes extra effort 0   Swallowing solids takes extra effort 1   Swallowing pills takes extra effort 1   Swallowing is painful 1   The pleasure of eating is affected by my swallowing 1   When I swallow food sticks in my throat 0   I cough when I eat 0   Swallowing is stressful 1   EAT-10 5           Patient Supplied Answers to Dyspnea Index Questionnaire:  No flowsheet data found.   "

## 2023-01-30 ENCOUNTER — OFFICE VISIT (OUTPATIENT)
Dept: OTOLARYNGOLOGY | Facility: CLINIC | Age: 68
End: 2023-01-30
Payer: MEDICARE

## 2023-01-30 DIAGNOSIS — R49.0 DYSPHONIA: Primary | ICD-10-CM

## 2023-01-30 DIAGNOSIS — R07.0 THROAT PAIN: ICD-10-CM

## 2023-01-30 DIAGNOSIS — J38.3 VOCAL CORD BOWING: ICD-10-CM

## 2023-01-30 PROCEDURE — 92507 TX SP LANG VOICE COMM INDIV: CPT | Mod: GN | Performed by: SPEECH-LANGUAGE PATHOLOGIST

## 2023-01-30 NOTE — PATIENT INSTRUCTIONS
"Hello!     It was a pleasure to see you today. Please complete the exercises below and remember, a few minutes of practice many times throughout the day is more important than one large practice session. If you have any questions about your strategies, feel free to contact me at [email or web address] or via [employer]. Please call [phone] for scheduling alterations or to be seen sooner in case of cancellations.      If your appointment is virtual, please remember to logon to [employer] a few minutes early to complete the questionnaires before your visit starts.      Home Exercise Program:  NASAL BREATHING  (Before bed)  1. Use a finger to press one nostril closed.  2. Take 20-30 slow deep breaths in and out through your nose.   3. Then switch to the other side.   ____________________________________________________    MUCUS MANAGEMENT (ongoing as needed)  -- Mucus is your body's way of cleaning out particles of dust, dirt, and allergens that you've breathed in. These particles get stuck in the mucus and it slowly drips out of your sinuses and down the back of your throat, where it is typically swallowed and removed from your body. Mucus is also produced to help protect your sinuses and nasal passages from excessive dryness in the winter, preventing those tissues from drying and cracking (nosebleeds). If your body is dehydrated, even normal mucus amounts can thicken and get stickier, causing more coughing and throat clearing.  Sinus rinse 2x/day for about a month (some people do daily) (NeilMed Sinus Rinse bottle with warmed distilled water and saline mix; breathe out through your mouth as you squeeze on a \"hhhh\")  A more convenient option is to use a liquid saline spray, 4 sprays per nostril, then blow your nose and repeat on the other side. Repeat up to 4x/day.   Gargle ~2 oz warm water with 1/2 tsp mixture, each AM/PM (Mix a container of equal parts salt/baking soda and keep next to your toothbrush);You can " "do this 5-6x/day.   Drink about 65 ounces of water each day- this number is based on your height and weight.   Gel saline spray to be used AM and/or PM (Norm Sanchez, easiest to find on Amazon)  Humidifier or bowl of warm water near your bed or in your office in the winter. Aim for about 45-55% humidity to prevent your mucosal tissues from over-drying during the winter. If you aren't sure what the humidity level is, you can purchase a hygrometer for about $10.  Steaming: hot showers, boil a pot of water and hold a towel over your head to make a steam tent, facial steamers, wring out a hot OR COLD, damp washcloth and then hold it over your nose and mouth while breathing.   Guaifenesin: this is the active ingredient in mucinex, but is cheaper. It's supposed to help thin your mucus so it's easier to clear it out, but you do have to drink a lot of extra water.   Hand nebulizer with 0.9% isotonic saline solution: Look up the research by Viji Vila and Sajan Barragan. This is the best way to directly hydrate the mucosa in your upper airway.     ____________________________________________________    COUGH & THROAT CLEAR AVOIDANCE (ongoing and as needed)  -- Use these strategies whenever you feel like you're about to cough or clear your throat. Most people pick 2-3 favorites.    1. Swallow really hard, like getting a golf ball down. Feel your throat squeeze really tight.   2. Keep water with you to sip.  3. See if icy water or ice chips soothe your throat, or if warm/hot beverages soothe your throat. If so, keep some handy in an insulated cup. Fizzy bueno can also be distracting. Switch between hot and cold.   4. Hum or sigh on a \"Hmmm\" and feel a gentle vibration in your throat. Get creative at work or in conversations: \"hmm...\" \"mmhmm...\" \"ummm\"  5. BLOWFISH & 5: Inhale through your nose. Imagine you're blowing up a balloon, but keep your lips closed so your throat and cheeks balloon instead. Hold your breath and count " "to 5.   6. Silently yawn, letting your tongue relax flat like a puppy dog. (You could try looking up youtube videos of someone else yawning)   7. Gargle a small sip of water. (Inhale first. Teeny tiny sips of water- less than you think you need. Turn your voice on \"uhhhh\" then bring your head back forward before you swallow) (Tilt your head side to side, stretch your tongue corner to corner, G G G G G)  8. Rescue Breaths   ---- Tongue Tuck-lightly touch the tip of your tongue to the roof of your mouth and focus on cool and quiet air sweeping and along the sides of your tongue as you breathe in and out; Really helpful when you're wearing a mask.  ---- Sniff & Blow-breathe slowly in through your nose like smelling a maxim then blow out with pursed lips and balloon your cheeks like blowing out a birthday candle OR Shhhh OR Sh sh sh sh sh   ---- Puppy Yawn & Blow-open your mouth and let your tongue rest flat against your bottom lip.  Take a quiet, yawny breath in and feel cool air sweep along your tongue and down the back your throat.  Then blowout with pursed lips and ballooned cheeks, like blowing out a birthday candle OR Shhhh OR Sh sh sh sh sh   9. Silently hold the cough in for 1 second. Next time, hold the cough in for 2 seconds, etc. Work your way toward 45 seconds of delay.   10. Gently clear your throat with a [employer]/hairball, or Samoan throaty \"achhh,\" or a silent \"ah ah ah\" like the Count from [employer]     HELPFUL HINTS:  -- Place reminder notes with 2-3 strategies each around your home & work place  -- Recruit friends and family to help remind you if they hear you cough/throat clear   ____________________________________________________     VOICE EXERCISES (AM & PM, more if needed)  -- Inhale SILENTLY and feel your belly fill with air  -- Slowly exhale as your belly contracts, ROUND \"OO\" LIPS  -- Large Diameter Straw (Milkshake or Boba straws) and 1 inch of water in a cup, finger hooked over the straw " "so it's attached to the cup  -- \"Whoooooo\" (smooth bubbles like a motor boat or a simmering pot of water)    3x silently with just air for 5-7 seconds  3x short and easy sighs on \"whooo\"  3x foghorn on single pitch for 5-7 seconds  3x lower for 5-7 seconds (tugboat)  3x higher for 5-7 seconds (flute)    -- Double-check that your LIPS are ROUNDED  -- Space between your molars and tongue out under the straw- aim to feel back vibrations    ____________________________________________________    IF DESIRED:      PhoRTE Homework Form: These help STRENGTHEN THE VOICE (louder, clearer voice and easier mucus clearance if you do cough or clear your throat).   Start with a big breath. Target a  megaphone mouth - wide in front!  DATE: ____________________     8-10 repetitions of  ah  on a sustained, comfortable pitch. Imagine the microphone went out during a reading \"loud\". Time your duration and hold as long as you can.    1 2 3 4 5 6 7 8 9 10   # of seconds                       (1 minute break)     8-10 repetitions of  ah  sliding from low to high to low pitch.    1 2 3 4 5 6 7 8 9 10   Completed                       (1 minute break)     2 repetitions of 10 phrases, as though calling over a fence to your neighbor (slightly elevated pitch). Rep #1 ___ (1 min break) Rep #2 ___ (1 min break)     2 repetitions of those same 10 phrases, in an authoritative (or slightly lower pitch, sanders) voice. Rep #1 ___ (1 min break) Rep #2 ___ (Done!)     Functional Phrases     How's my honey this morning?  What are we having for dinner?  What would you like me to get from the store?  What would you like to do this weekend?  I don't care.   What would you like to watch on tv?                        Thank you and have a great day!  [email or web address] [email or web address]. (voice), M.S., CCC-SLP  Speech-Language Pathologist  [facility]  [phone]  [email or web address]  Pronouns: she/her/hers     "

## 2023-01-30 NOTE — LETTER
"1/30/2023       RE: Chris Meadows  [address] [facility] [address]     Dear Colleague,    Thank you for referring your patient, Chris Meadows, to the [facility] VOICE CLINIC [place] [facility]. Please see a copy of my visit note below.    [facility] VOICE CLINIC  PROGRESS REPORT (CPT 87158)    Patient: Chris Meadows  Date of Service: 1/30/2023  Date of Last Service: 1/12/23  Number of Visits: 3  Referring Physician: Dr. Azra Cho    Impressions from most recent evaluation by Colin Fernandez CCC-SLP on 6/28/2022:  \"Chris Meadows is presenting today with throat discomfort and a history of Zenker's diverticulum status postsurgical repair in 2018.  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  Perceptually these findings manifest as intermittent strain, asthenia, and rough voice quality, that corresponds to moments of decreased respiratory drive and coordination of airflow, glottal control, and resonance.  In absence of discoordination increased muscular recruitment is noted and this corresponds to areas of tenderness on laryngeal palpation helping to explain the patient's persistent throat pain.  In addition to these elements laryngeal evaluation also showed some regurgitation from the UES consistent with ongoing presence of Zenker's diverticulum.  Please see Dr. Cho and Smita Dow CCC-SLP's notes from today's date for additional details regarding the patient's swallowing difficulties.  \"    SUBJECTIVE:  Since Chris's last session, he reports the following:   o He's been doing better with using his strategies to avoid coughing as much as possible. He feels like his cough is less intense and less frequent.    o He still has some cough triggered by nasal " "drainage.   o He particularly is bothered by getting congested at night and waking up having difficulty breathing through his nose, needing to roll over.   o His voice continues to fluctuate and be increasingly soft spoken as he gets older.   o It's most helpful to sip warm water, along with exercise to help his overall well-being.   o He just returned from being on vacation and forgot to take his homework along.    OBJECTIVE:  Patient Specific Goal Metrics:  Dysponia SLP Goals 11/16/2022 1/12/2023   How would you rate your speaking voice quality, if 0 is worst voice quality, and 10 is best voice? 6 7   How much effort is it to speak, if 0 is no extra effort and 10 is maximum effort? 6 3   How severe is your cough /throat clearing, if 0 is no cough at all and 10 is the worst cough? 4 6   How much does your voice problem bother you? Quite a bit Somewhat   How much does your cough/throat-clearing problem bother you?            A little bit Somewhat       THERAPEUTIC ACTIVITIES  Today Chris participated in the following therapeutic activities:  Counseling and Education:    Asked questions about the nature of his symptoms, and I answered all of these thoroughly.    He was provided additional education regarding how the voice exercises work with consistent practice over time. He stated he doesn't mind being soft spoken    Concepts and techniques for using saline and plain-water gargling, nasal irrigation, gel saline spray, humidification, increased liquid intake, and steam to increase systemic and typical hydration and reduce post-nasal drainage and thickened secretions and laryngeal irritation.     Recommendations for nasal breathing to help with night-time congestion.    I provided Chris with explanation and skilled instruction of:  Exercises to coordinate phonation with optimal flowing airstream and reduce phonatory impact.     Semi-occluded vocal tract exercises with a straw and cup of 1-1.5\" water (\"straw and " "bubbles\") were most facilitating    He progressed through the warm-up level of phonatory complexity    Instructed to use as a voice warm up, cool down, coordination of breath flow with phonation, and for tissue mobilization.    Specific modifications instructed included labial rounding, jaw release    Patient demonstrated good learning, but will need practice and additional reinforcement    Instruction of Home Practice:    A revised regimen for home practice was instructed.    I provided an AVS of important notes of today's therapeutic activities to facilitate practice.    ASSESSMENT/PLAN  Progress toward long-term goals:  Good progress; please see report above for objective measures    Impressions:  IMPRESSIONS: Throat Pain (R07.0) and Dysphonia (R49.0) in the context of Vocal Fold Bowing (J38.3)     Chris had a productive session of therapy today, working on mucus management and SOVT tasks targeting reduced laryngeal hyperfunction and irritation.  He will continue to work on his exercises on a daily basis, and work on incorporating the techniques into his daily activities. Speech therapy is no longer medically necessary at this time as all goals have been met and he is able to continue practicing with independent home activities, but he will contact our clinic should additional concerns arise in the future.     Plan:   Discharge.     For home practice goals, see AVS.     TOTAL SERVICE TIME: 50 minutes  TREATMENT (87905)  NO CHARGE FACILITY FEE    Roselia Copeland (voice), M.S., CCC-SLP  Speech-Language Pathologist  [facility] Voice [facility]  [phone]  [email or web address]  Pronouns: she/her/hers      *this report was created in part through the use of computerized dictation software, and though reviewed following completion, some typographic errors may persist.  If there is confusion regarding any of this notes contents, please contact me for clarification    OBJECTIVE FINDINGS  PATIENT REPORTED " "MEASURES  Patient Supplied Answers To Last 2 VHI Questionnaires  Voice Handicap Index (VHI-10) 6/23/2022   My voice makes it difficult for people to hear me 2   People have difficulty understanding me in a noisy room 2   My voice difficulties restrict my personal and social life.  2   I feel left out of conversations because of my voice 1   My voice problem causes me to lose income 0   I feel as though I have to strain to produce voice 1   The clarity of my voice is unpredictable 2   My voice problem upsets me 2   My voice makes me feel handicapped 2   People ask, \"What's wrong with your voice?\" 1   VHI-10 15            Patient Supplied Answers To Last 2 CSI Questionnaires  Cough Severity Index (CSI) 6/23/2022   My cough is worse when I lie down 0   My coughing problem causes me to restrict my personal and social life 0   I tend to avoid places because of my cough problem 0   I feel embarrassed because of my coughing problem 0   People ask, ''What's wrong?'' because I cough a lot 0   I run out of air when I cough 0   My coughing problem affects my voice 1   My coughing problem limits my physical activity 0   My coughing problem upsets me 0   People ask me if I am sick because I cough a lot 0   CSI Score 1            Patient Supplied Answers To Last 2 EAT Questionnaires  Eating Assessment Tool (EAT-10) 6/23/2022   My swallowing problem has caused me to lose weight 0   My swallowing problem interferes with my ability to go out for meals 0   Swallowing liquids takes extra effort 0   Swallowing solids takes extra effort 1   Swallowing pills takes extra effort 1   Swallowing is painful 1   The pleasure of eating is affected by my swallowing 1   When I swallow food sticks in my throat 0   I cough when I eat 0   Swallowing is stressful 1   EAT-10 5           Patient Supplied Answers to Dyspnea Index Questionnaire:  No flowsheet data found.       Again, thank you for allowing me to participate in the care of your patient.  "     Sincerely,    Alejandra Maher, SLP

## 2023-01-30 NOTE — PROGRESS NOTES
"[facility] VOICE CLINIC  PROGRESS REPORT (CPT 64777)    Patient: Chris Meadows  Date of Service: 1/30/2023  Date of Last Service: 1/12/23  Number of Visits: 3  Referring Physician: Dr. Azra Cho    Impressions from most recent evaluation by Colin Fernandez CCC-SLP on 6/28/2022:  \"Chris Meadows is presenting today with throat discomfort and a history of Zenker's diverticulum status postsurgical repair in 2018.  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  Perceptually these findings manifest as intermittent strain, asthenia, and rough voice quality, that corresponds to moments of decreased respiratory drive and coordination of airflow, glottal control, and resonance.  In absence of discoordination increased muscular recruitment is noted and this corresponds to areas of tenderness on laryngeal palpation helping to explain the patient's persistent throat pain.  In addition to these elements laryngeal evaluation also showed some regurgitation from the UES consistent with ongoing presence of Zenker's diverticulum.  Please see Dr. Cho and Smita Dow CCC-SLP's notes from today's date for additional details regarding the patient's swallowing difficulties.  \"    SUBJECTIVE:  Since Chris's last session, he reports the following:   o He's been doing better with using his strategies to avoid coughing as much as possible. He feels like his cough is less intense and less frequent.    o He still has some cough triggered by nasal drainage.   o He particularly is bothered by getting congested at night and waking up having difficulty breathing through his nose, needing to roll over.   o His voice continues to fluctuate and be increasingly soft spoken as he gets older.   o It's most helpful to sip warm water, along with exercise to help " "his overall well-being.   o He just returned from being on vacation and forgot to take his homework along.    OBJECTIVE:  Patient Specific Goal Metrics:  Dysponia SLP Goals 11/16/2022 1/12/2023   How would you rate your speaking voice quality, if 0 is worst voice quality, and 10 is best voice? 6 7   How much effort is it to speak, if 0 is no extra effort and 10 is maximum effort? 6 3   How severe is your cough /throat clearing, if 0 is no cough at all and 10 is the worst cough? 4 6   How much does your voice problem bother you? Quite a bit Somewhat   How much does your cough/throat-clearing problem bother you?            A little bit Somewhat       THERAPEUTIC ACTIVITIES  Today Chris participated in the following therapeutic activities:  Counseling and Education:    Asked questions about the nature of his symptoms, and I answered all of these thoroughly.    He was provided additional education regarding how the voice exercises work with consistent practice over time. He stated he doesn't mind being soft spoken    Concepts and techniques for using saline and plain-water gargling, nasal irrigation, gel saline spray, humidification, increased liquid intake, and steam to increase systemic and typical hydration and reduce post-nasal drainage and thickened secretions and laryngeal irritation.     Recommendations for nasal breathing to help with night-time congestion.    I provided Chris with explanation and skilled instruction of:  Exercises to coordinate phonation with optimal flowing airstream and reduce phonatory impact.     Semi-occluded vocal tract exercises with a straw and cup of 1-1.5\" water (\"straw and bubbles\") were most facilitating    He progressed through the warm-up level of phonatory complexity    Instructed to use as a voice warm up, cool down, coordination of breath flow with phonation, and for tissue mobilization.    Specific modifications instructed included labial rounding, jaw release    Patient " demonstrated good learning, but will need practice and additional reinforcement    Instruction of Home Practice:    A revised regimen for home practice was instructed.    I provided an AVS of important notes of today's therapeutic activities to facilitate practice.    ASSESSMENT/PLAN  Progress toward long-term goals:  Good progress; please see report above for objective measures    Impressions:  IMPRESSIONS: Throat Pain (R07.0) and Dysphonia (R49.0) in the context of Vocal Fold Bowing (J38.3)     Chris had a productive session of therapy today, working on mucus management and SOVT tasks targeting reduced laryngeal hyperfunction and irritation.  He will continue to work on his exercises on a daily basis, and work on incorporating the techniques into his daily activities. Speech therapy is no longer medically necessary at this time as all goals have been met and he is able to continue practicing with independent home activities, but he will contact our clinic should additional concerns arise in the future.     Plan:   Discharge.     For home practice goals, see AVS.     TOTAL SERVICE TIME: 50 minutes  TREATMENT (18337)  NO CHARGE FACILITY FEE    Roselia Copeland (voice), M.S., CCC-SLP  Speech-Language Pathologist  [facility] Voice [facility]  [phone]  [email or web address]  Pronouns: she/her/hers      *this report was created in part through the use of computerized dictation software, and though reviewed following completion, some typographic errors may persist.  If there is confusion regarding any of this notes contents, please contact me for clarification    OBJECTIVE FINDINGS  PATIENT REPORTED MEASURES  Patient Supplied Answers To Last 2 VHI Questionnaires  Voice Handicap Index (VHI-10) 6/23/2022   My voice makes it difficult for people to hear me 2   People have difficulty understanding me in a noisy room 2   My voice difficulties restrict my personal and social life.  2   I feel left out of conversations  "because of my voice 1   My voice problem causes me to lose income 0   I feel as though I have to strain to produce voice 1   The clarity of my voice is unpredictable 2   My voice problem upsets me 2   My voice makes me feel handicapped 2   People ask, \"What's wrong with your voice?\" 1   VHI-10 15            Patient Supplied Answers To Last 2 CSI Questionnaires  Cough Severity Index (CSI) 6/23/2022   My cough is worse when I lie down 0   My coughing problem causes me to restrict my personal and social life 0   I tend to avoid places because of my cough problem 0   I feel embarrassed because of my coughing problem 0   People ask, ''What's wrong?'' because I cough a lot 0   I run out of air when I cough 0   My coughing problem affects my voice 1   My coughing problem limits my physical activity 0   My coughing problem upsets me 0   People ask me if I am sick because I cough a lot 0   CSI Score 1            Patient Supplied Answers To Last 2 EAT Questionnaires  Eating Assessment Tool (EAT-10) 6/23/2022   My swallowing problem has caused me to lose weight 0   My swallowing problem interferes with my ability to go out for meals 0   Swallowing liquids takes extra effort 0   Swallowing solids takes extra effort 1   Swallowing pills takes extra effort 1   Swallowing is painful 1   The pleasure of eating is affected by my swallowing 1   When I swallow food sticks in my throat 0   I cough when I eat 0   Swallowing is stressful 1   EAT-10 5           Patient Supplied Answers to Dyspnea Index Questionnaire:  No flowsheet data found.   "

## 2023-04-30 ENCOUNTER — HEALTH MAINTENANCE LETTER (OUTPATIENT)
Age: 68
End: 2023-04-30

## 2024-06-12 NOTE — PROGRESS NOTES
Outpatient Speech Language Therapy Evaluation  PLAN OF TREATMENT FOR OUTPATIENT REHABILITATION  (COMPLETE FOR INITIAL CLAIMS ONLY)  Patient's Last Name, First Name, M.I.                         YOB: 1955  Chris Meadows                           Provider's Name  Colin Fernandez, SLP Medical Record No.  [id number]                                Onset Date:  6/28/22    Start of Care Date: 6/28/22      Type: Speech Language Therapy Medical Diagnosis: Dysphonia R49.0                        Therapy Diagnosis:  Dysphonia (R49.0)    Visits from SOC:  2   _________________________________________________________________________________    Due to significant limitations to clinical availability, therapy was not able to be initiated within the initial certification period. Given patient's complaints are consistent with prior evaluation and have not changed substantively formal reevaluation is not warranted, and this is considered the start of the same episode of care from a therapeutic standpoint.     Plan of Treatment:   Speech therapy     DETAILED RECOMMENDATIONS:   A course of speech therapy is recommended to optimize vocal technique, improve voice quality and promote reduced discomfort, effort and fatigue.  He demonstrates a Good prognosis for improvement given adherence to therapeutic recommendations.   Positive indicators: positive response to therapy probes diagnosis is known to respond to treatment  Negative indicators: External locus of control  DURATION / FREQUENCY: 4 biweekly and 2 monthly one-hour sessions  Research: This patient is not a candidate.       GOALS:  Patient goal:   To understand the problem and fix it as much as possible     Short-term goal(s): Within the first 4 sessions, Mr. Meadows:  will demonstrate assigned laryngeal massage techniques with 80% accuracy or better with no clinician support  will be able  to independently list key factors in maintenance of good vocal hygiene with 80% accuracy, and report on their use outside the therapy room.  will accurately identify target vs. habitual voice quality during therapy tasks in 4 out of 5 trials with no clinician support  will demonstrate the ability to alternate between target and habitual voice quality given clinician cue 75% of the time during therapy tasks     Long-term goal(s): In 6 months, Mr. Meadows will:  Report a week of typical vocal activities, in which dysphonia and throat discomfort do not exceed a level of 2 out of 10, 80% of the time      _________________________________________________________________________________     I CERTIFY THE NEED FOR THESE SERVICES FURNISHED UNDER                   THIS PLAN OF TREATMENT AND WHILE UNDER MY CARE     (Physician attestation of this document indicates review and certification of the therapy plan).      Certification date from: 11/16/22  Certification date to: 2/14/23     Referring Provider: Azra Cho

## 2024-07-07 ENCOUNTER — HEALTH MAINTENANCE LETTER (OUTPATIENT)
Age: 69
End: 2024-07-07

## 2025-07-13 ENCOUNTER — HEALTH MAINTENANCE LETTER (OUTPATIENT)
Age: 70
End: 2025-07-13

## (undated) DEVICE — DILATOR CRE PULM BALLOON 15-16.5-18MMX75CM 5.5CM WIRE 5031

## (undated) DEVICE — DRSG EYE PAD STERILE 1.63X2.63" NON21600

## (undated) DEVICE — ENDO TOOTH GUARD SAC2001

## (undated) DEVICE — KIT ENDO FIRST STEP DISINFECTANT 200ML W/POUCH EP-4

## (undated) DEVICE — Device

## (undated) DEVICE — PACK ENT ENDOSCOPY UMMC

## (undated) DEVICE — TUBING SUCTION MEDI-VAC SOFT 3/16"X20' N520A

## (undated) DEVICE — SUCTION MANIFOLD NEPTUNE 2 SYS 4 PORT 0702-020-000

## (undated) DEVICE — ENDO SYSTEM WATER BOTTLE & TUBING W/CO2 FILTER 00711549

## (undated) DEVICE — LINEN TOWEL PACK X5 5464

## (undated) DEVICE — SOL WATER IRRIG 1000ML BOTTLE 2F7114

## (undated) DEVICE — JELLY LUBRICATING SURGILUBE 2OZ TUBE

## (undated) DEVICE — KIT CONNECTOR FOR OLYMPUS ENDOSCOPES DEFENDO 100310

## (undated) DEVICE — SPONGE COTTONOID 1/2X3" 80-1407

## (undated) DEVICE — DRSG TELFA 3"X8" NON25720

## (undated) DEVICE — SOL NACL 0.9% IRRIG 1000ML BOTTLE 2F7124

## (undated) RX ORDER — LIDOCAINE HYDROCHLORIDE 20 MG/ML
SOLUTION OROPHARYNGEAL
Status: DISPENSED
Start: 2022-09-09

## (undated) RX ORDER — HYDROMORPHONE HYDROCHLORIDE 1 MG/ML
INJECTION, SOLUTION INTRAMUSCULAR; INTRAVENOUS; SUBCUTANEOUS
Status: DISPENSED
Start: 2022-08-03

## (undated) RX ORDER — HYDROMORPHONE HCL IN WATER/PF 6 MG/30 ML
PATIENT CONTROLLED ANALGESIA SYRINGE INTRAVENOUS
Status: DISPENSED
Start: 2022-08-03

## (undated) RX ORDER — SODIUM CHLORIDE, SODIUM LACTATE, POTASSIUM CHLORIDE, CALCIUM CHLORIDE 600; 310; 30; 20 MG/100ML; MG/100ML; MG/100ML; MG/100ML
INJECTION, SOLUTION INTRAVENOUS
Status: DISPENSED
Start: 2022-08-03

## (undated) RX ORDER — FENTANYL CITRATE 50 UG/ML
INJECTION, SOLUTION INTRAMUSCULAR; INTRAVENOUS
Status: DISPENSED
Start: 2022-08-03

## (undated) RX ORDER — OXYMETAZOLINE HYDROCHLORIDE 0.05 G/100ML
SPRAY NASAL
Status: DISPENSED
Start: 2022-08-03

## (undated) RX ORDER — AMPICILLIN AND SULBACTAM 2; 1 G/1; G/1
INJECTION, POWDER, FOR SOLUTION INTRAMUSCULAR; INTRAVENOUS
Status: DISPENSED
Start: 2022-08-03